# Patient Record
Sex: MALE | Race: WHITE | NOT HISPANIC OR LATINO | Employment: OTHER | URBAN - METROPOLITAN AREA
[De-identification: names, ages, dates, MRNs, and addresses within clinical notes are randomized per-mention and may not be internally consistent; named-entity substitution may affect disease eponyms.]

---

## 2023-03-07 ENCOUNTER — TELEPHONE (OUTPATIENT)
Dept: PRIMARY CARE | Facility: CLINIC | Age: 88
End: 2023-03-07
Payer: MEDICARE

## 2023-03-07 NOTE — TELEPHONE ENCOUNTER
Flavia called and stated that Victorino was taken by squad to the hospital and is Covid positive.  A team of Drs will see him today. She will up date us today

## 2023-03-10 ENCOUNTER — NURSING HOME VISIT (OUTPATIENT)
Dept: POST ACUTE CARE | Facility: EXTERNAL LOCATION | Age: 88
End: 2023-03-10
Payer: MEDICARE

## 2023-03-10 ENCOUNTER — TELEPHONE (OUTPATIENT)
Dept: PRIMARY CARE | Facility: CLINIC | Age: 88
End: 2023-03-10

## 2023-03-10 VITALS
DIASTOLIC BLOOD PRESSURE: 70 MMHG | HEART RATE: 60 BPM | BODY MASS INDEX: 18.94 KG/M2 | WEIGHT: 132 LBS | TEMPERATURE: 98.1 F | SYSTOLIC BLOOD PRESSURE: 130 MMHG | RESPIRATION RATE: 18 BRPM | OXYGEN SATURATION: 98 %

## 2023-03-10 DIAGNOSIS — B00.9 HSV INFECTION: ICD-10-CM

## 2023-03-10 DIAGNOSIS — F02.A0 MILD LATE ONSET ALZHEIMER'S DEMENTIA WITHOUT BEHAVIORAL DISTURBANCE, PSYCHOTIC DISTURBANCE, MOOD DISTURBANCE, OR ANXIETY (MULTI): ICD-10-CM

## 2023-03-10 DIAGNOSIS — G30.1 MILD LATE ONSET ALZHEIMER'S DEMENTIA WITHOUT BEHAVIORAL DISTURBANCE, PSYCHOTIC DISTURBANCE, MOOD DISTURBANCE, OR ANXIETY (MULTI): ICD-10-CM

## 2023-03-10 DIAGNOSIS — I10 BENIGN ESSENTIAL HTN: ICD-10-CM

## 2023-03-10 DIAGNOSIS — Z95.0 CARDIAC PACEMAKER: ICD-10-CM

## 2023-03-10 DIAGNOSIS — U07.1 COVID-19: Primary | ICD-10-CM

## 2023-03-10 PROBLEM — H91.90 HEARING LOSS: Status: ACTIVE | Noted: 2023-03-10

## 2023-03-10 PROBLEM — I44.2 CHB (COMPLETE HEART BLOCK) (MULTI): Status: ACTIVE | Noted: 2023-03-10

## 2023-03-10 PROBLEM — E78.5 HLD (HYPERLIPIDEMIA): Status: ACTIVE | Noted: 2023-03-10

## 2023-03-10 PROBLEM — R00.1 BRADYCARDIA, SINUS, PERSISTENT, SEVERE: Status: ACTIVE | Noted: 2023-03-10

## 2023-03-10 PROBLEM — N28.9 RENAL INSUFFICIENCY: Status: ACTIVE | Noted: 2023-03-10

## 2023-03-10 PROBLEM — I73.9 PAD (PERIPHERAL ARTERY DISEASE) (CMS-HCC): Status: ACTIVE | Noted: 2023-03-10

## 2023-03-10 PROBLEM — I44.0 AV BLOCK, 1ST DEGREE: Status: ACTIVE | Noted: 2023-03-10

## 2023-03-10 PROBLEM — I35.0 AORTIC STENOSIS: Status: ACTIVE | Noted: 2023-03-10

## 2023-03-10 PROBLEM — I82.629 DVT OF UPPER EXTREMITY (DEEP VEIN THROMBOSIS) (MULTI): Status: ACTIVE | Noted: 2023-03-10

## 2023-03-10 PROCEDURE — 99310 SBSQ NF CARE HIGH MDM 45: CPT | Performed by: NURSE PRACTITIONER

## 2023-03-10 ASSESSMENT — ENCOUNTER SYMPTOMS
NAUSEA: 0
PALPITATIONS: 0
DIFFICULTY URINATING: 0
VOMITING: 0
ABDOMINAL PAIN: 0
SHORTNESS OF BREATH: 0
CHILLS: 0
FATIGUE: 0
CONSTIPATION: 0
DIARRHEA: 0
COUGH: 0
FEVER: 0

## 2023-03-10 NOTE — ASSESSMENT & PLAN NOTE
Presented to \[facility]\"" with weakness, known exposure and tested postive for COVID.  He received 1 dose remdesivir in ER, developed transaminitis and remedesivir was not continued.  He did not require supplemental oxygen or other covid specific therapies, mild sx.  He states he feels well this morning.   He will remain in cvid isolation per facility protocol.

## 2023-03-10 NOTE — ASSESSMENT & PLAN NOTE
Mild but per hospital notes family whom he lives with states he has had a significant decline, incontinent and may need increased level of care to meet his needs.  SS for discharge planning pending clinical course of rehab.

## 2023-03-10 NOTE — LETTER
Subjective  Victorino Bejarano is a 89 y.o. male here for skilled care following hospitalization due COVID infection, weakness.     HPI   He presented to the hospital for increased wekaness, known covid exposure and tested positive.  He received 1 dose of remdesivir and developed transaminitis  and as sx were mild  no further doses given.  He is stting up in chair, states he is feeling well.  No cough, fever, chills, sob, myalgia.  Eating and drinking well.  No concerns per staff.  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  He resides with daughter and son in law, per hospital notes he has had a significant decline in the last few months, becoming incontinent and may need to consider long term care.     Labs: 3/9  WBC:  7.0  Hgb:  11.9  Hct: 35.1  Platelet: 178    Na:  137  K: 3.8  Cl: 105  Co2: 25  BUN: 43  Creatinine: 1.71  GFR: 35  Alkaline phos 193        MEDS:  Clopidogrel  Diclofenac gel  Tamsulosin  Valcycylovir    MEDS THAT WERE STOPPED: allopurinol, hydrochlorothiazide              Review of Systems   Constitutional:  Negative for chills, fatigue and fever.   Respiratory:  Negative for cough and shortness of breath.    Cardiovascular:  Negative for chest pain and palpitations.   Gastrointestinal:  Negative for abdominal pain, constipation, diarrhea, nausea and vomiting.   Genitourinary:  Negative for difficulty urinating.       Objective  /70   Pulse 60   Temp 36.7 °C (98.1 °F)   Resp 18   Wt 59.9 kg (132 lb)   SpO2 98%   BMI 18.94 kg/m²     Physical Exam  Constitutional:       General: He is not in acute distress.     Comments: Thin, frail appearing elderly male.   Cachectic.   HENT:      Head: Normocephalic and atraumatic.   Eyes:      Conjunctiva/sclera: Conjunctivae normal.   Cardiovascular:      Rate and Rhythm: Normal rate and regular rhythm.   Pulmonary:      Effort: Pulmonary effort is normal.  No respiratory distress.      Breath sounds: Normal breath sounds.   Abdominal:      General: Bowel sounds are normal. There is no distension.      Palpations: Abdomen is soft.      Tenderness: There is no abdominal tenderness.   Musculoskeletal:         General: Normal range of motion.      Right lower leg: No edema.      Left lower leg: No edema.   Skin:     General: Skin is warm and dry.   Neurological:      General: No focal deficit present.      Mental Status: He is alert.      Comments: Oriented to person and place.     Psychiatric:         Mood and Affect: Mood normal.         Behavior: Behavior normal.         Assessment/Plan  Problem List Items Addressed This Visit          Nervous    Mild late onset Alzheimer's dementia without behavioral disturbance, psychotic disturbance, mood disturbance, or anxiety (CMS/HCC)     Mild but per hospital notes family whom he lives with states he has had a significant decline, incontinent and may need increased level of care to meet his needs.  SS for discharge planning pending clinical course of rehab.             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         COVID-19 - Primary     Presented to [facility] with weakness, known exposure and tested postive for COVID.  He received 1 dose remdesivir in ER, developed transaminitis and remedesivir was not continued.  He did not require supplemental oxygen or other covid specific therapies, mild sx.  He states he feels well this morning.   He will remain in cvid isolation per facility protocol.             labs/meds/orders reviewed  staff to monitor and notify for any changes.  continue with therapy as able.  Ss for discharge planning, resides with family but has increased care needs.   Time for coordination of care was greater than  35 minutes with hospital chart review, visit and exam, discussion of treatment plan with patient and also discussion of case with staff.

## 2023-03-10 NOTE — PROGRESS NOTES
Subjective   Victorino Bejarano is a 89 y.o. male here for skilled care following hospitalization due COVID infection, weakness.     HPI   He presented to the hospital for increased wekaness, known covid exposure and tested positive.  He received 1 dose of remdesivir and developed transaminitis  and as sx were mild  no further doses given.  He is stting up in chair, states he is feeling well.  No cough, fever, chills, sob, myalgia.  Eating and drinking well.  No concerns per staff.  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  He resides with daughter and son in law, per hospital notes he has had a significant decline in the last few months, becoming incontinent and may need to consider long term care.     Labs: 3/9  WBC:  7.0  Hgb:  11.9  Hct: 35.1  Platelet: 178    Na:  137  K: 3.8  Cl: 105  Co2: 25  BUN: 43  Creatinine: 1.71  GFR: 35  Alkaline phos 193        MEDS:  Clopidogrel  Diclofenac gel  Tamsulosin  Valcycylovir    MEDS THAT WERE STOPPED: allopurinol, hydrochlorothiazide              Review of Systems   Constitutional:  Negative for chills, fatigue and fever.   Respiratory:  Negative for cough and shortness of breath.    Cardiovascular:  Negative for chest pain and palpitations.   Gastrointestinal:  Negative for abdominal pain, constipation, diarrhea, nausea and vomiting.   Genitourinary:  Negative for difficulty urinating.       Objective   /70   Pulse 60   Temp 36.7 °C (98.1 °F)   Resp 18   Wt 59.9 kg (132 lb)   SpO2 98%   BMI 18.94 kg/m²     Physical Exam  Constitutional:       General: He is not in acute distress.     Comments: Thin, frail appearing elderly male.   Cachectic.   HENT:      Head: Normocephalic and atraumatic.   Eyes:      Conjunctiva/sclera: Conjunctivae normal.   Cardiovascular:      Rate and Rhythm: Normal rate and regular rhythm.   Pulmonary:      Effort: Pulmonary effort is normal.  No respiratory distress.      Breath sounds: Normal breath sounds.   Abdominal:      General: Bowel sounds are normal. There is no distension.      Palpations: Abdomen is soft.      Tenderness: There is no abdominal tenderness.   Musculoskeletal:         General: Normal range of motion.      Right lower leg: No edema.      Left lower leg: No edema.   Skin:     General: Skin is warm and dry.   Neurological:      General: No focal deficit present.      Mental Status: He is alert.      Comments: Oriented to person and place.     Psychiatric:         Mood and Affect: Mood normal.         Behavior: Behavior normal.         Assessment/Plan   Problem List Items Addressed This Visit          Nervous    Mild late onset Alzheimer's dementia without behavioral disturbance, psychotic disturbance, mood disturbance, or anxiety (CMS/HCC)     Mild but per hospital notes family whom he lives with states he has had a significant decline, incontinent and may need increased level of care to meet his needs.  SS for discharge planning pending clinical course of rehab.             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         COVID-19 - Primary     Presented to [facility] with weakness, known exposure and tested postive for COVID.  He received 1 dose remdesivir in ER, developed transaminitis and remedesivir was not continued.  He did not require supplemental oxygen or other covid specific therapies, mild sx.  He states he feels well this morning.   He will remain in cvid isolation per facility protocol.             labs/meds/orders reviewed  staff to monitor and notify for any changes.  continue with therapy as able.  Ss for discharge planning, resides with family but has increased care needs.   Time for coordination of care was greater  than 35 minutes with hospital chart review, visit and exam, discussion of treatment plan with patient and also discussion of case with staff.

## 2023-03-10 NOTE — TELEPHONE ENCOUNTER
Sydney called and stated Victorino is in [facility] for Rehab but will most likely not come home.  Their Drs will take over his care.

## 2023-03-16 ENCOUNTER — NURSING HOME VISIT (OUTPATIENT)
Dept: POST ACUTE CARE | Facility: EXTERNAL LOCATION | Age: 88
End: 2023-03-16
Payer: MEDICARE

## 2023-03-16 VITALS
BODY MASS INDEX: 18.94 KG/M2 | SYSTOLIC BLOOD PRESSURE: 122 MMHG | OXYGEN SATURATION: 97 % | WEIGHT: 132 LBS | TEMPERATURE: 97.7 F | RESPIRATION RATE: 18 BRPM | DIASTOLIC BLOOD PRESSURE: 61 MMHG | HEART RATE: 76 BPM

## 2023-03-16 DIAGNOSIS — I35.0 NONRHEUMATIC AORTIC VALVE STENOSIS: ICD-10-CM

## 2023-03-16 DIAGNOSIS — Z95.0 CARDIAC PACEMAKER: ICD-10-CM

## 2023-03-16 DIAGNOSIS — U07.1 COVID-19: ICD-10-CM

## 2023-03-16 DIAGNOSIS — F02.A0 MILD LATE ONSET ALZHEIMER'S DEMENTIA WITHOUT BEHAVIORAL DISTURBANCE, PSYCHOTIC DISTURBANCE, MOOD DISTURBANCE, OR ANXIETY (MULTI): Primary | ICD-10-CM

## 2023-03-16 DIAGNOSIS — I10 BENIGN ESSENTIAL HTN: ICD-10-CM

## 2023-03-16 DIAGNOSIS — G30.1 MILD LATE ONSET ALZHEIMER'S DEMENTIA WITHOUT BEHAVIORAL DISTURBANCE, PSYCHOTIC DISTURBANCE, MOOD DISTURBANCE, OR ANXIETY (MULTI): Primary | ICD-10-CM

## 2023-03-16 PROCEDURE — 99309 SBSQ NF CARE MODERATE MDM 30: CPT | Performed by: NURSE PRACTITIONER

## 2023-03-16 ASSESSMENT — ENCOUNTER SYMPTOMS
CONSTIPATION: 0
COUGH: 0
VOMITING: 0
CHILLS: 0
PALPITATIONS: 0
SHORTNESS OF BREATH: 0
ABDOMINAL PAIN: 0
DIARRHEA: 0
FEVER: 0
DIFFICULTY URINATING: 0
NAUSEA: 0
FATIGUE: 0

## 2023-03-16 NOTE — LETTER
Patient: Victorino Bejarano  : 10/25/1924    Encounter Date: 2023    Subjective  Victorino Bejarano is a 89 y.o. male Here for weekly skilled visit.   HPI  Health problems reviewed and no acute concerns.  Participating in therapy and feeling stronger.  Eating and drinking well.  he denies any complaints of pain.  No concerns per staff.   He has completed covid quarantine, no sx at present.   Riding a bike in therapy, no cough or sob.  [place].       Review of Systems   Constitutional:  Negative for chills, fatigue and fever.   Respiratory:  Negative for cough and shortness of breath.    Cardiovascular:  Negative for chest pain and palpitations.   Gastrointestinal:  Negative for abdominal pain, constipation, diarrhea, nausea and vomiting.   Genitourinary:  Negative for difficulty urinating.       Objective  /61   Pulse 76   Temp 36.5 °C (97.7 °F)   Resp 18   Wt 59.9 kg (132 lb)   SpO2 97%   BMI 18.94 kg/m²     Physical Exam  Constitutional:       General: He is not in acute distress.  HENT:      Head: Normocephalic and atraumatic.   Eyes:      Conjunctiva/sclera: Conjunctivae normal.   Cardiovascular:      Rate and Rhythm: Normal rate. Rhythm irregular.   Pulmonary:      Effort: Pulmonary effort is normal. No respiratory distress.      Breath sounds: Normal breath sounds.   Abdominal:      General: Bowel sounds are normal. There is no distension.      Palpations: Abdomen is soft.      Tenderness: There is no abdominal tenderness.   Musculoskeletal:         General: Normal range of motion.      Right lower leg: No edema.      Left lower leg: No edema.   Skin:     General: Skin is warm and dry.   Neurological:      General: No focal deficit present.      Mental Status: He is alert.      Comments: Slightly forgetful, poor historian.     Psychiatric:         Mood and Affect: Mood normal.         Behavior: Behavior normal.         Assessment/Plan  Problem List Items Addressed This Visit          Nervous     Mild late onset Alzheimer's dementia without behavioral disturbance, psychotic disturbance, mood disturbance, or anxiety (CMS/[facility]) - Primary     Mild but per hospital notes family whom he lives with states he has had a significant decline, incontinent and may need increased level of care to meet his needs.  SS for discharge planning pending clinical course of rehab.              Circulatory    Nonrheumatic aortic valve stenosis     Denies any sob, lightheadedness.           Cardiac pacemaker     Hx complete heart block          Benign essential HTN     Not on meds,  continue to monitor and initate meds based on clinical course             Infectious/Inflammatory    COVID-19     He has completed COVID isolation and denies any cough, fever, chills .          labs/meds/orders reviewed  staff to monitor and notify for any changes.  continue with therapy as able.  Ss for discharge planning        Electronically Signed By: NIELS Camarillo   3/16/23  1:04 PM

## 2023-03-16 NOTE — PROGRESS NOTES
Subjective   Victorino Bejarano is a 89 y.o. male Here for weekly skilled visit.   HPI  Health problems reviewed and no acute concerns.  Participating in therapy and feeling stronger.  Eating and drinking well.  he denies any complaints of pain.  No concerns per staff.   He has completed covid quarantine, no sx at present.   Riding a bike in therapy, no cough or sob.  [place].       Review of Systems   Constitutional:  Negative for chills, fatigue and fever.   Respiratory:  Negative for cough and shortness of breath.    Cardiovascular:  Negative for chest pain and palpitations.   Gastrointestinal:  Negative for abdominal pain, constipation, diarrhea, nausea and vomiting.   Genitourinary:  Negative for difficulty urinating.       Objective   /61   Pulse 76   Temp 36.5 °C (97.7 °F)   Resp 18   Wt 59.9 kg (132 lb)   SpO2 97%   BMI 18.94 kg/m²     Physical Exam  Constitutional:       General: He is not in acute distress.  HENT:      Head: Normocephalic and atraumatic.   Eyes:      Conjunctiva/sclera: Conjunctivae normal.   Cardiovascular:      Rate and Rhythm: Normal rate. Rhythm irregular.   Pulmonary:      Effort: Pulmonary effort is normal. No respiratory distress.      Breath sounds: Normal breath sounds.   Abdominal:      General: Bowel sounds are normal. There is no distension.      Palpations: Abdomen is soft.      Tenderness: There is no abdominal tenderness.   Musculoskeletal:         General: Normal range of motion.      Right lower leg: No edema.      Left lower leg: No edema.   Skin:     General: Skin is warm and dry.   Neurological:      General: No focal deficit present.      Mental Status: He is alert.      Comments: Slightly forgetful, poor historian.     Psychiatric:         Mood and Affect: Mood normal.         Behavior: Behavior normal.         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  SS for discharge planning pending clinical course of rehab.              Circulatory    Nonrheumatic aortic valve stenosis     Denies any sob, lightheadedness.           Cardiac pacemaker     Hx complete heart block          Benign essential HTN     Not on meds,  continue to monitor and initate meds based on clinical course             Infectious/Inflammatory    COVID-19     He has completed COVID isolation and denies any cough, fever, chills .          labs/meds/orders reviewed  staff to monitor and notify for any changes.  continue with therapy as able.  Ss for discharge planning

## 2023-03-17 ENCOUNTER — NURSING HOME VISIT (OUTPATIENT)
Dept: POST ACUTE CARE | Facility: EXTERNAL LOCATION | Age: 88
End: 2023-03-17
Payer: MEDICARE

## 2023-03-17 VITALS
RESPIRATION RATE: 18 BRPM | SYSTOLIC BLOOD PRESSURE: 125 MMHG | OXYGEN SATURATION: 97 % | WEIGHT: 133 LBS | DIASTOLIC BLOOD PRESSURE: 68 MMHG | TEMPERATURE: 97.8 F | BODY MASS INDEX: 19.08 KG/M2 | HEART RATE: 77 BPM

## 2023-03-17 DIAGNOSIS — U07.1 COVID-19: Primary | ICD-10-CM

## 2023-03-17 DIAGNOSIS — Z95.0 CARDIAC PACEMAKER: ICD-10-CM

## 2023-03-17 DIAGNOSIS — E44.1 MILD PROTEIN-CALORIE MALNUTRITION (MULTI): ICD-10-CM

## 2023-03-17 DIAGNOSIS — I10 BENIGN ESSENTIAL HTN: ICD-10-CM

## 2023-03-17 PROCEDURE — 99306 1ST NF CARE HIGH MDM 50: CPT | Performed by: INTERNAL MEDICINE

## 2023-03-17 NOTE — LETTER
Patient: Victorino Bejarano  : 10/25/1924    Encounter Date: 2023    Subjective  Patient ID: Victorino Bejarano is a 89 y.o. male who is acute skilled care being seen and evaluated for multiple medical problems.    HPI   Victorino Bejarano is a 89 y.o. male here for skilled care following hospitalization due COVID infection, weakness.      HPI   He presented to the hospital for increased wekaness, known covid exposure and tested positive.  He received 1 dose of remdesivir and developed transaminitis  and as sx were mild  no further doses given.  He is stting up in chair, states he is feeling well.  No cough, fever, chills, sob, myalgia.  Eating and drinking well.  No concerns per staff.  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  He resides with daughter and son in law, per hospital notes he has had a significant decline in the last few months, becoming incontinent and may need to consider long term care.    Subtle the patient is resting comfortably in bed in no distress.  He has no complaints for me today.  Review of the hospital stay indicates that the patient's hydrochlorothiazide and allopurinol were discontinued due to following completion and liver injury.  Nursing has no adverse events reported to me at this time.     Labs: 3/9  WBC:  7.0  Hgb:  11.9  Hct: 35.1  Platelet: 178     Na:  137  K: 3.8  Cl: 105  Co2: 25  BUN: 43  Creatinine: 1.71  GFR: 35  Alkaline phos 193        Albumin 3.0  Phosphorus 2.6  Chest x-ray negative  CT scan of the brain negative     MEDS:  Clopidogrel  Diclofenac gel  Tamsulosin  Valcycylovir     MEDS THAT WERE STOPPED: allopurinol, hydrochlorothiazide     Review of Systems   Constitutional: Negative.  Negative for chills, diaphoresis and fever.   Respiratory: Negative.  Negative for cough and shortness of breath.    Cardiovascular: Negative.  Negative for chest pain and palpitations.    Gastrointestinal: Negative.  Negative for abdominal pain, nausea and vomiting.   Musculoskeletal: Negative.  Negative for joint swelling and myalgias.       Objective  /68   Pulse 77   Temp 36.6 °C (97.8 °F)   Resp 18   Wt 60.3 kg (133 lb)   SpO2 97%   BMI 19.08 kg/m²     Physical Exam  Vitals reviewed.   Constitutional:       General: He is not in acute distress.     Appearance: He is normal weight. He is not ill-appearing.      Comments: This is a frail elderly male with decreased muscle mass in no distress   Cardiovascular:      Rate and Rhythm: Normal rate and regular rhythm.      Pulses: Normal pulses.      Heart sounds: Normal heart sounds.      No gallop.   Pulmonary:      Breath sounds: Normal breath sounds. No wheezing, rhonchi or rales.   Abdominal:      General: Abdomen is flat. Bowel sounds are normal.      Palpations: Abdomen is soft.      Tenderness: There is no guarding or rebound.   Musculoskeletal:      Right lower leg: No edema.      Left lower leg: No edema.      Comments: Diffusely decreased muscle mass   Skin:     General: Skin is warm and dry.   Neurological:      General: No focal deficit present.         Assessment/Plan  Problem List Items Addressed This Visit          Circulatory    Cardiac pacemaker    Benign essential HTN       Endocrine/Metabolic    Protein calorie malnutrition (CMS/HCC)       Infectious/Inflammatory    COVID-19 - Primary        Goals    None     A.  We will continue with rehabilitative restorative and supportive care as the patient tolerates    B.  Laboratory examinations will continue to be drawn on an ongoing as-needed basis.  We will recheck liver function panel in 2 weeks    C.  If liver functions stabilize and patient develops any gouty symptoms then we will have to explore the possibility of reinstitution of allopurinol therapy.    D.  Disposition will be pending response to rehabilitation and overall assessment of patient safety awareness however it  is possible the patient may require long-term care    E.  The patient's prognosis is guarded.      Electronically Signed By: Saurabh Luna MD   3/20/23  5:57 PM

## 2023-03-17 NOTE — PROGRESS NOTES
Subjective   Patient ID: Victorino Bejarano is a 89 y.o. male who is acute skilled care being seen and evaluated for multiple medical problems.    HPI   Victorino Bejarano is a 89 y.o. male here for skilled care following hospitalization due COVID infection, weakness.      HPI   He presented to the hospital for increased wekaness, known covid exposure and tested positive.  He received 1 dose of remdesivir and developed transaminitis  and as sx were mild  no further doses given.  He is stting up in chair, states he is feeling well.  No cough, fever, chills, sob, myalgia.  Eating and drinking well.  No concerns per staff.  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  He resides with daughter and son in law, per hospital notes he has had a significant decline in the last few months, becoming incontinent and may need to consider long term care.    Subtle the patient is resting comfortably in bed in no distress.  He has no complaints for me today.  Review of the hospital stay indicates that the patient's hydrochlorothiazide and allopurinol were discontinued due to following completion and liver injury.  Nursing has no adverse events reported to me at this time.     Labs: 3/9  WBC:  7.0  Hgb:  11.9  Hct: 35.1  Platelet: 178     Na:  137  K: 3.8  Cl: 105  Co2: 25  BUN: 43  Creatinine: 1.71  GFR: 35  Alkaline phos 193        Albumin 3.0  Phosphorus 2.6  Chest x-ray negative  CT scan of the brain negative     MEDS:  Clopidogrel  Diclofenac gel  Tamsulosin  Valcycylovir     MEDS THAT WERE STOPPED: allopurinol, hydrochlorothiazide     Review of Systems   Constitutional: Negative.  Negative for chills, diaphoresis and fever.   Respiratory: Negative.  Negative for cough and shortness of breath.    Cardiovascular: Negative.  Negative for chest pain and palpitations.   Gastrointestinal: Negative.  Negative for abdominal pain, nausea and  vomiting.   Musculoskeletal: Negative.  Negative for joint swelling and myalgias.       Objective   /68   Pulse 77   Temp 36.6 °C (97.8 °F)   Resp 18   Wt 60.3 kg (133 lb)   SpO2 97%   BMI 19.08 kg/m²     Physical Exam  Vitals reviewed.   Constitutional:       General: He is not in acute distress.     Appearance: He is normal weight. He is not ill-appearing.      Comments: This is a frail elderly male with decreased muscle mass in no distress   Cardiovascular:      Rate and Rhythm: Normal rate and regular rhythm.      Pulses: Normal pulses.      Heart sounds: Normal heart sounds.      No gallop.   Pulmonary:      Breath sounds: Normal breath sounds. No wheezing, rhonchi or rales.   Abdominal:      General: Abdomen is flat. Bowel sounds are normal.      Palpations: Abdomen is soft.      Tenderness: There is no guarding or rebound.   Musculoskeletal:      Right lower leg: No edema.      Left lower leg: No edema.      Comments: Diffusely decreased muscle mass   Skin:     General: Skin is warm and dry.   Neurological:      General: No focal deficit present.         Assessment/Plan   Problem List Items Addressed This Visit          Circulatory    Cardiac pacemaker    Benign essential HTN       Endocrine/Metabolic    Protein calorie malnutrition (CMS/HCC)       Infectious/Inflammatory    COVID-19 - Primary        Goals    None     A.  We will continue with rehabilitative restorative and supportive care as the patient tolerates    B.  Laboratory examinations will continue to be drawn on an ongoing as-needed basis.  We will recheck liver function panel in 2 weeks    C.  If liver functions stabilize and patient develops any gouty symptoms then we will have to explore the possibility of reinstitution of allopurinol therapy.    D.  Disposition will be pending response to rehabilitation and overall assessment of patient safety awareness however it is possible the patient may require long-term care    E.  The  patient's prognosis is guarded.

## 2023-03-20 PROBLEM — E46 PROTEIN CALORIE MALNUTRITION (MULTI): Status: ACTIVE | Noted: 2023-03-20

## 2023-03-20 ASSESSMENT — ENCOUNTER SYMPTOMS
CARDIOVASCULAR NEGATIVE: 1
ABDOMINAL PAIN: 0
VOMITING: 0
PALPITATIONS: 0
CHILLS: 0
DIAPHORESIS: 0
SHORTNESS OF BREATH: 0
FEVER: 0
COUGH: 0
MYALGIAS: 0
GASTROINTESTINAL NEGATIVE: 1
NAUSEA: 0
MUSCULOSKELETAL NEGATIVE: 1
JOINT SWELLING: 0
CONSTITUTIONAL NEGATIVE: 1
RESPIRATORY NEGATIVE: 1

## 2023-03-24 ENCOUNTER — NURSING HOME VISIT (OUTPATIENT)
Dept: POST ACUTE CARE | Facility: EXTERNAL LOCATION | Age: 88
End: 2023-03-24
Payer: MEDICARE

## 2023-03-24 VITALS
HEART RATE: 74 BPM | TEMPERATURE: 97.6 F | RESPIRATION RATE: 18 BRPM | SYSTOLIC BLOOD PRESSURE: 123 MMHG | WEIGHT: 141 LBS | BODY MASS INDEX: 20.23 KG/M2 | OXYGEN SATURATION: 98 % | DIASTOLIC BLOOD PRESSURE: 60 MMHG

## 2023-03-24 DIAGNOSIS — F02.A0 MILD LATE ONSET ALZHEIMER'S DEMENTIA WITHOUT BEHAVIORAL DISTURBANCE, PSYCHOTIC DISTURBANCE, MOOD DISTURBANCE, OR ANXIETY (MULTI): ICD-10-CM

## 2023-03-24 DIAGNOSIS — I10 BENIGN ESSENTIAL HTN: ICD-10-CM

## 2023-03-24 DIAGNOSIS — R60.0 LEG EDEMA: Primary | ICD-10-CM

## 2023-03-24 DIAGNOSIS — G30.1 MILD LATE ONSET ALZHEIMER'S DEMENTIA WITHOUT BEHAVIORAL DISTURBANCE, PSYCHOTIC DISTURBANCE, MOOD DISTURBANCE, OR ANXIETY (MULTI): ICD-10-CM

## 2023-03-24 DIAGNOSIS — N28.9 RENAL INSUFFICIENCY: ICD-10-CM

## 2023-03-24 PROCEDURE — 99309 SBSQ NF CARE MODERATE MDM 30: CPT | Performed by: INTERNAL MEDICINE

## 2023-03-24 NOTE — LETTER
Patient: Victorino Bejarano  : 10/25/1924    Encounter Date: 2023    Subjective  Patient ID: Victorino Bejarano is a 89 y.o. male who is acute skilled care being seen and evaluated for multiple medical problems.    HPI   This patient is resting comfortably in bed in no distress.  He has absolutely no complaints of pain shortness of breath fevers chills nausea or vomiting to me at this time.  Nursing reports he has been doing quite well.  He has developed edema of bilateral lower extremities above the ankle.  His weight has been noted to increase over the past 3 weeks.  Of note the patient's allopurinol and hydrochlorothiazide were held while inpatient due to a mild liver injury.    Current high risk medication:  Tamsulosin  Plavix  Valacyclovir    There are no new laboratory examinations for review of the chart at this time.    Review of Systems   Constitutional: Negative.  Negative for chills, diaphoresis and fever.   Respiratory: Negative.  Negative for cough and shortness of breath.    Cardiovascular: Negative.  Negative for chest pain and palpitations.   Gastrointestinal: Negative.  Negative for abdominal pain, nausea and vomiting.   Musculoskeletal: Negative.  Negative for joint swelling and myalgias.       Objective  /60   Pulse 74   Temp 36.4 °C (97.6 °F)   Resp 18   Wt 64 kg (141 lb)   SpO2 98%   BMI 20.23 kg/m²     Physical Exam  Vitals reviewed.   Constitutional:       General: He is not in acute distress.     Appearance: He is not ill-appearing.   Cardiovascular:      Rate and Rhythm: Normal rate and regular rhythm.      Pulses: Normal pulses.      Heart sounds: Normal heart sounds.      No gallop.   Pulmonary:      Breath sounds: Normal breath sounds. No wheezing, rhonchi or rales.   Abdominal:      General: Abdomen is flat. Bowel sounds are normal.      Palpations: Abdomen is soft.      Tenderness: There is no guarding or rebound.   Musculoskeletal:      Right lower leg: Edema (1+ edema  to ankle) present.      Left lower leg: Edema (1+ edema to ankle) present.         Assessment/Plan  Problem List Items Addressed This Visit          Nervous    Mild late onset Alzheimer's dementia without behavioral disturbance, psychotic disturbance, mood disturbance, or anxiety (CMS/HCC)       Circulatory    Benign essential HTN       Genitourinary    Renal insufficiency       Musculoskeletal    Leg edema - Primary     A.  We will continue with rehabilitative restorative and supportive care as the patient tolerates    B.  Triamterene/HCTZ 37.5/25 1/2 tablet daily has been started as a low-dose diuretic in place of plain hydrochlorothiazide in hopes of avoiding hypokalemia which the patient may have had a moderate amount of when he presented to the hospital.    C.  Laboratory examination for renal function magnesium will be repeated 1 week after starting the diuretic therapy    D.  Disposition will be determined pending response to rehabilitation overall assessment of patient safety awareness    E.  The patient's prognosis is guarded.        Electronically Signed By: Saurabh Luna MD   3/27/23  1:36 PM

## 2023-03-24 NOTE — PROGRESS NOTES
Subjective   Patient ID: Victorino Bejarano is a 89 y.o. male who is acute skilled care being seen and evaluated for multiple medical problems.    HPI   This patient is resting comfortably in bed in no distress.  He has absolutely no complaints of pain shortness of breath fevers chills nausea or vomiting to me at this time.  Nursing reports he has been doing quite well.  He has developed edema of bilateral lower extremities above the ankle.  His weight has been noted to increase over the past 3 weeks.  Of note the patient's allopurinol and hydrochlorothiazide were held while inpatient due to a mild liver injury.    Current high risk medication:  Tamsulosin  Plavix  Valacyclovir    There are no new laboratory examinations for review of the chart at this time.    Review of Systems   Constitutional: Negative.  Negative for chills, diaphoresis and fever.   Respiratory: Negative.  Negative for cough and shortness of breath.    Cardiovascular: Negative.  Negative for chest pain and palpitations.   Gastrointestinal: Negative.  Negative for abdominal pain, nausea and vomiting.   Musculoskeletal: Negative.  Negative for joint swelling and myalgias.       Objective   /60   Pulse 74   Temp 36.4 °C (97.6 °F)   Resp 18   Wt 64 kg (141 lb)   SpO2 98%   BMI 20.23 kg/m²     Physical Exam  Vitals reviewed.   Constitutional:       General: He is not in acute distress.     Appearance: He is not ill-appearing.   Cardiovascular:      Rate and Rhythm: Normal rate and regular rhythm.      Pulses: Normal pulses.      Heart sounds: Normal heart sounds.      No gallop.   Pulmonary:      Breath sounds: Normal breath sounds. No wheezing, rhonchi or rales.   Abdominal:      General: Abdomen is flat. Bowel sounds are normal.      Palpations: Abdomen is soft.      Tenderness: There is no guarding or rebound.   Musculoskeletal:      Right lower leg: Edema (1+ edema to ankle) present.      Left lower leg: Edema (1+ edema to ankle)  present.         Assessment/Plan   Problem List Items Addressed This Visit          Nervous    Mild late onset Alzheimer's dementia without behavioral disturbance, psychotic disturbance, mood disturbance, or anxiety (CMS/HCC)       Circulatory    Benign essential HTN       Genitourinary    Renal insufficiency       Musculoskeletal    Leg edema - Primary     A.  We will continue with rehabilitative restorative and supportive care as the patient tolerates    B.  Triamterene/HCTZ 37.5/25 1/2 tablet daily has been started as a low-dose diuretic in place of plain hydrochlorothiazide in hopes of avoiding hypokalemia which the patient may have had a moderate amount of when he presented to the hospital.    C.  Laboratory examination for renal function magnesium will be repeated 1 week after starting the diuretic therapy    D.  Disposition will be determined pending response to rehabilitation overall assessment of patient safety awareness    E.  The patient's prognosis is guarded.

## 2023-03-27 PROBLEM — R60.0 LEG EDEMA: Status: ACTIVE | Noted: 2023-03-27

## 2023-03-27 ASSESSMENT — ENCOUNTER SYMPTOMS
DIAPHORESIS: 0
CONSTITUTIONAL NEGATIVE: 1
MYALGIAS: 0
VOMITING: 0
ABDOMINAL PAIN: 0
GASTROINTESTINAL NEGATIVE: 1
CHILLS: 0
FEVER: 0
SHORTNESS OF BREATH: 0
JOINT SWELLING: 0
MUSCULOSKELETAL NEGATIVE: 1
CARDIOVASCULAR NEGATIVE: 1
COUGH: 0
NAUSEA: 0
RESPIRATORY NEGATIVE: 1
PALPITATIONS: 0

## 2023-03-30 ENCOUNTER — NURSING HOME VISIT (OUTPATIENT)
Dept: POST ACUTE CARE | Facility: EXTERNAL LOCATION | Age: 88
End: 2023-03-30
Payer: MEDICARE

## 2023-03-30 VITALS
RESPIRATION RATE: 18 BRPM | OXYGEN SATURATION: 96 % | BODY MASS INDEX: 20.23 KG/M2 | DIASTOLIC BLOOD PRESSURE: 74 MMHG | WEIGHT: 141 LBS | SYSTOLIC BLOOD PRESSURE: 124 MMHG | HEART RATE: 76 BPM | TEMPERATURE: 97.8 F

## 2023-03-30 DIAGNOSIS — U07.1 COVID-19: ICD-10-CM

## 2023-03-30 DIAGNOSIS — N28.9 RENAL INSUFFICIENCY: ICD-10-CM

## 2023-03-30 DIAGNOSIS — I10 BENIGN ESSENTIAL HTN: ICD-10-CM

## 2023-03-30 DIAGNOSIS — Z95.0 CARDIAC PACEMAKER: ICD-10-CM

## 2023-03-30 DIAGNOSIS — F02.A0 MILD LATE ONSET ALZHEIMER'S DEMENTIA WITHOUT BEHAVIORAL DISTURBANCE, PSYCHOTIC DISTURBANCE, MOOD DISTURBANCE, OR ANXIETY (MULTI): Primary | ICD-10-CM

## 2023-03-30 DIAGNOSIS — G30.1 MILD LATE ONSET ALZHEIMER'S DEMENTIA WITHOUT BEHAVIORAL DISTURBANCE, PSYCHOTIC DISTURBANCE, MOOD DISTURBANCE, OR ANXIETY (MULTI): Primary | ICD-10-CM

## 2023-03-30 PROCEDURE — 99309 SBSQ NF CARE MODERATE MDM 30: CPT | Performed by: NURSE PRACTITIONER

## 2023-03-30 ASSESSMENT — ENCOUNTER SYMPTOMS
SHORTNESS OF BREATH: 0
CONSTIPATION: 0
PALPITATIONS: 0
COUGH: 0
FATIGUE: 0
ABDOMINAL PAIN: 0
DIFFICULTY URINATING: 0
CHILLS: 0
VOMITING: 0
FEVER: 0
DIARRHEA: 0
NAUSEA: 0

## 2023-03-30 NOTE — PROGRESS NOTES
Subjective   Victorino Bejarano is a 89 y.o. male Here for weekly skilled visit.   HPI  Health problems reviewed and no acute concerns.  Participating in therapy and feeling stronger.  Eating and drinking well.  he denies any complaints of pain.  No concerns per staff.  [place].       Review of Systems   Constitutional:  Negative for chills, fatigue and fever.   Respiratory:  Negative for cough and shortness of breath.    Cardiovascular:  Negative for chest pain and palpitations.   Gastrointestinal:  Negative for abdominal pain, constipation, diarrhea, nausea and vomiting.   Genitourinary:  Negative for difficulty urinating.       Objective   /74   Pulse 76   Temp 36.6 °C (97.8 °F)   Resp 18   Wt 64 kg (141 lb)   SpO2 96%   BMI 20.23 kg/m²     Physical Exam  Constitutional:       General: He is not in acute distress.  HENT:      Head: Normocephalic and atraumatic.   Eyes:      Conjunctiva/sclera: Conjunctivae normal.   Cardiovascular:      Rate and Rhythm: Normal rate. Rhythm irregular.   Pulmonary:      Effort: Pulmonary effort is normal. No respiratory distress.      Breath sounds: Normal breath sounds.   Abdominal:      General: Bowel sounds are normal. There is no distension.      Palpations: Abdomen is soft.      Tenderness: There is no abdominal tenderness.   Musculoskeletal:         General: Normal range of motion.      Right lower leg: No edema.      Left lower leg: No edema.   Skin:     General: Skin is warm and dry.   Neurological:      General: No focal deficit present.      Mental Status: He is alert.      Comments: Slightly forgetful, poor historian.     Psychiatric:         Mood and Affect: Mood normal.         Behavior: Behavior normal.         Assessment/Plan   Problem List Items Addressed This Visit    None  labs/meds/orders reviewed  staff to monitor and notify for any changes.  continue with therapy as able.  Ss for discharge planning

## 2023-03-30 NOTE — LETTER
Patient: Victorino Bejarano  : 10/25/1924    Encounter Date: 2023    Subjective  Victorino Bejarano is a 89 y.o. male Here for weekly skilled visit.   HPI  Health problems reviewed and no acute concerns.  Participating in therapy and feeling stronger.  Eating and drinking well.  he denies any complaints of pain.  No concerns per staff.  [place].       Review of Systems   Constitutional:  Negative for chills, fatigue and fever.   Respiratory:  Negative for cough and shortness of breath.    Cardiovascular:  Negative for chest pain and palpitations.   Gastrointestinal:  Negative for abdominal pain, constipation, diarrhea, nausea and vomiting.   Genitourinary:  Negative for difficulty urinating.       Objective  /74   Pulse 76   Temp 36.6 °C (97.8 °F)   Resp 18   Wt 64 kg (141 lb)   SpO2 96%   BMI 20.23 kg/m²     Physical Exam  Constitutional:       General: He is not in acute distress.  HENT:      Head: Normocephalic and atraumatic.   Eyes:      Conjunctiva/sclera: Conjunctivae normal.   Cardiovascular:      Rate and Rhythm: Normal rate. Rhythm irregular.   Pulmonary:      Effort: Pulmonary effort is normal. No respiratory distress.      Breath sounds: Normal breath sounds.   Abdominal:      General: Bowel sounds are normal. There is no distension.      Palpations: Abdomen is soft.      Tenderness: There is no abdominal tenderness.   Musculoskeletal:         General: Normal range of motion.      Right lower leg: No edema.      Left lower leg: No edema.   Skin:     General: Skin is warm and dry.   Neurological:      General: No focal deficit present.      Mental Status: He is alert.      Comments: Slightly forgetful, poor historian.     Psychiatric:         Mood and Affect: Mood normal.         Behavior: Behavior normal.         Assessment/Plan  Problem List Items Addressed This Visit    None  labs/meds/orders reviewed  staff to monitor and notify for any changes.  continue with therapy as able.  Ss for  discharge planning        Electronically Signed By: NIELS Camarillo   3/30/23 12:01 PM

## 2023-03-30 NOTE — ASSESSMENT & PLAN NOTE
Mild but per hospital notes family whom he lives with states he has had a significant decline, incontinent and may need increased level of care to meet his needs.  SS for discharge planning pending clinical course of rehab.   Per staff family is pursuing long term care.

## 2023-04-06 ENCOUNTER — NURSING HOME VISIT (OUTPATIENT)
Dept: POST ACUTE CARE | Facility: EXTERNAL LOCATION | Age: 88
End: 2023-04-06
Payer: MEDICARE

## 2023-04-06 VITALS
TEMPERATURE: 97.7 F | DIASTOLIC BLOOD PRESSURE: 68 MMHG | OXYGEN SATURATION: 98 % | BODY MASS INDEX: 20.37 KG/M2 | SYSTOLIC BLOOD PRESSURE: 121 MMHG | RESPIRATION RATE: 18 BRPM | HEART RATE: 74 BPM | WEIGHT: 142 LBS

## 2023-04-06 DIAGNOSIS — Z95.0 CARDIAC PACEMAKER: ICD-10-CM

## 2023-04-06 DIAGNOSIS — I10 BENIGN ESSENTIAL HTN: ICD-10-CM

## 2023-04-06 DIAGNOSIS — F02.A0 MILD LATE ONSET ALZHEIMER'S DEMENTIA WITHOUT BEHAVIORAL DISTURBANCE, PSYCHOTIC DISTURBANCE, MOOD DISTURBANCE, OR ANXIETY (MULTI): Primary | ICD-10-CM

## 2023-04-06 DIAGNOSIS — G30.1 MILD LATE ONSET ALZHEIMER'S DEMENTIA WITHOUT BEHAVIORAL DISTURBANCE, PSYCHOTIC DISTURBANCE, MOOD DISTURBANCE, OR ANXIETY (MULTI): Primary | ICD-10-CM

## 2023-04-06 DIAGNOSIS — U07.1 COVID-19: ICD-10-CM

## 2023-04-06 DIAGNOSIS — N28.9 RENAL INSUFFICIENCY: ICD-10-CM

## 2023-04-06 PROCEDURE — 99309 SBSQ NF CARE MODERATE MDM 30: CPT | Performed by: NURSE PRACTITIONER

## 2023-04-06 ASSESSMENT — ENCOUNTER SYMPTOMS
VOMITING: 0
DIARRHEA: 0
CHILLS: 0
COUGH: 0
PALPITATIONS: 0
FATIGUE: 0
CONSTIPATION: 0
NAUSEA: 0
FEVER: 0
ABDOMINAL PAIN: 0
DIFFICULTY URINATING: 0
SHORTNESS OF BREATH: 0

## 2023-04-06 NOTE — LETTER
Patient: Victorino Bejarano  : 10/25/1924    Encounter Date: 2023    Subjective  Victorino Bejarano is a 89 y.o. male Here for weekly skilled visit.   HPI  Health problems reviewed and no acute concerns.  Participating in therapy and feeling stronger.  Eating and drinking well.  he denies any complaints of pain.  No concerns per staff.  [place].   He will be completing skilled care and transition to long term care.        Review of Systems   Constitutional:  Negative for chills, fatigue and fever.   Respiratory:  Negative for cough and shortness of breath.    Cardiovascular:  Negative for chest pain and palpitations.   Gastrointestinal:  Negative for abdominal pain, constipation, diarrhea, nausea and vomiting.   Genitourinary:  Negative for difficulty urinating.       Objective  /68   Pulse 74   Temp 36.5 °C (97.7 °F)   Resp 18   Wt 64.4 kg (142 lb)   SpO2 98%   BMI 20.37 kg/m²     Physical Exam  Constitutional:       General: He is not in acute distress.  HENT:      Head: Normocephalic and atraumatic.   Eyes:      Conjunctiva/sclera: Conjunctivae normal.   Cardiovascular:      Rate and Rhythm: Normal rate. Rhythm irregular.   Pulmonary:      Effort: Pulmonary effort is normal. No respiratory distress.      Breath sounds: Normal breath sounds.   Abdominal:      General: Bowel sounds are normal. There is no distension.      Palpations: Abdomen is soft.      Tenderness: There is no abdominal tenderness.   Musculoskeletal:         General: Normal range of motion.      Right lower leg: No edema.      Left lower leg: No edema.   Skin:     General: Skin is warm and dry.   Neurological:      General: No focal deficit present.      Mental Status: He is alert.      Comments: Slightly forgetful, poor historian.     Psychiatric:         Mood and Affect: Mood normal.         Behavior: Behavior normal.         Assessment/Plan  Problem List Items Addressed This Visit          Nervous    Mild late onset Alzheimer's  dementia without behavioral disturbance, psychotic disturbance, mood disturbance, or anxiety (CMS/[facility]) - Primary     He will be completing skilled care and will be transitioning to long term care.                 Circulatory    Cardiac pacemaker     Hx complete heart block          Benign essential HTN     Not on meds,  continue to monitor and initate meds based on clinical course             Genitourinary    Renal insufficiency     monitor with routine labs.              Infectious/Inflammatory    COVID-19     He has completed COVID isolation and denies any cough, fever, chills .          labs/meds/orders reviewed  staff to monitor and notify for any changes.  continue with therapy as able until complete and then will transition to long term care.   Next labs 6/23 and prn        Electronically Signed By: JONNY Camarillo-CNP   4/6/23  3:10 PM

## 2023-04-06 NOTE — PROGRESS NOTES
Subjective   Victorino Bejarano is a 89 y.o. male Here for weekly skilled visit.   HPI  Health problems reviewed and no acute concerns.  Participating in therapy and feeling stronger.  Eating and drinking well.  he denies any complaints of pain.  No concerns per staff.  [place].   He will be completing skilled care and transition to long term care.        Review of Systems   Constitutional:  Negative for chills, fatigue and fever.   Respiratory:  Negative for cough and shortness of breath.    Cardiovascular:  Negative for chest pain and palpitations.   Gastrointestinal:  Negative for abdominal pain, constipation, diarrhea, nausea and vomiting.   Genitourinary:  Negative for difficulty urinating.       Objective   /68   Pulse 74   Temp 36.5 °C (97.7 °F)   Resp 18   Wt 64.4 kg (142 lb)   SpO2 98%   BMI 20.37 kg/m²     Physical Exam  Constitutional:       General: He is not in acute distress.  HENT:      Head: Normocephalic and atraumatic.   Eyes:      Conjunctiva/sclera: Conjunctivae normal.   Cardiovascular:      Rate and Rhythm: Normal rate. Rhythm irregular.   Pulmonary:      Effort: Pulmonary effort is normal. No respiratory distress.      Breath sounds: Normal breath sounds.   Abdominal:      General: Bowel sounds are normal. There is no distension.      Palpations: Abdomen is soft.      Tenderness: There is no abdominal tenderness.   Musculoskeletal:         General: Normal range of motion.      Right lower leg: No edema.      Left lower leg: No edema.   Skin:     General: Skin is warm and dry.   Neurological:      General: No focal deficit present.      Mental Status: He is alert.      Comments: Slightly forgetful, poor historian.     Psychiatric:         Mood and Affect: Mood normal.         Behavior: Behavior normal.         Assessment/Plan   Problem List Items Addressed This Visit          Nervous    Mild late onset Alzheimer's dementia without behavioral disturbance, psychotic disturbance, mood  disturbance, or anxiety (CMS/[facility]) - Primary     He will be completing skilled care and will be transitioning to long term care.                 Circulatory    Cardiac pacemaker     Hx complete heart block          Benign essential HTN     Not on meds,  continue to monitor and initate meds based on clinical course             Genitourinary    Renal insufficiency     monitor with routine labs.              Infectious/Inflammatory    COVID-19     He has completed COVID isolation and denies any cough, fever, chills .          labs/meds/orders reviewed  staff to monitor and notify for any changes.  continue with therapy as able until complete and then will transition to long term care.   Next labs 6/23 and prn

## 2023-04-20 ENCOUNTER — TELEPHONE (OUTPATIENT)
Dept: PRIMARY CARE | Facility: CLINIC | Age: 88
End: 2023-04-20
Payer: MEDICARE

## 2023-04-20 NOTE — TELEPHONE ENCOUNTER
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  Spent 30 days in rehab and now a permanent resident.  He has friends and seems happy and his energy is back.  I know you tried to order a wheel chair from [facility] but Krystal called and said she needs notes etc on this request.  The type of wheelchair is called \"transport chair\" Item #[id number].  They run about $159.99.  Can you have your office work on this again.  Sorry I know this was done in October 2022 but [facility] called and said it is not complete and needs to say \"transport chair\".  Hope you are well.  Sydney     "

## 2023-04-26 ENCOUNTER — TELEPHONE (OUTPATIENT)
Dept: PRIMARY CARE | Facility: CLINIC | Age: 88
End: 2023-04-26
Payer: MEDICARE

## 2023-04-26 NOTE — TELEPHONE ENCOUNTER
Krystal from [facility] called and they need chart notes saying a transport chair was discussed at his last appointment or medicare will not cover chair. I dont see anything.     Needs to be faxed to Krystal [phone]

## 2023-05-05 ENCOUNTER — NURSING HOME VISIT (OUTPATIENT)
Dept: POST ACUTE CARE | Facility: EXTERNAL LOCATION | Age: 88
End: 2023-05-05
Payer: MEDICARE

## 2023-05-05 DIAGNOSIS — N28.9 RENAL INSUFFICIENCY: ICD-10-CM

## 2023-05-05 DIAGNOSIS — I10 BENIGN ESSENTIAL HTN: ICD-10-CM

## 2023-05-05 DIAGNOSIS — E83.52 HYPERCALCEMIA: Primary | ICD-10-CM

## 2023-05-05 DIAGNOSIS — E44.1 MILD PROTEIN-CALORIE MALNUTRITION (MULTI): ICD-10-CM

## 2023-05-05 DIAGNOSIS — I73.9 PAD (PERIPHERAL ARTERY DISEASE) (CMS-HCC): ICD-10-CM

## 2023-05-05 PROCEDURE — 99309 SBSQ NF CARE MODERATE MDM 30: CPT | Performed by: INTERNAL MEDICINE

## 2023-05-05 NOTE — LETTER
Patient: Victorino Bejarano  : 10/25/1924    Encounter Date: 2023    Subjective  Patient ID: Victorino Bejarano is a 89 y.o. male who is acute skilled care being seen and evaluated for multiple medical problems.    HPI   89-year-old male patient sitting in his recliner chair watching TV in no distress whatsoever.  He denies any complaints to me at this time.  He has no shortness of breath or chest pain.  The patient tells me that for the time being and wishes to stay here at the extended care facility.  Nursing has no adverse events reported to me.    Current high risk medication:  Maxide  Valtrex  Plavix  Flomax    Laboratory examination from 2023:  Albumin 3.1  Calcium 11.4 which corrects to about 12  Liver injury test normal  Creatinine 1.31  Potassium 4.7  Bicarbonate 22    Review of Systems   Constitutional:  Negative for chills, diaphoresis and fever.   Respiratory:  Negative for cough and shortness of breath.    Cardiovascular:  Negative for chest pain and leg swelling.   Gastrointestinal:  Negative for constipation, diarrhea, nausea and vomiting.   Musculoskeletal:  Negative for joint swelling and myalgias.       Objective  /66   Pulse 82   Temp 36.6 °C (97.8 °F)   Resp 18   Wt 64.4 kg (142 lb)   SpO2 97%   BMI 20.37 kg/m²     Physical Exam  Constitutional:       General: He is not in acute distress.  HENT:      Head: Normocephalic and atraumatic.   Eyes:      Conjunctiva/sclera: Conjunctivae normal.   Cardiovascular:      Rate and Rhythm: Normal rate. Rhythm irregular.   Pulmonary:      Effort: Pulmonary effort is normal. No respiratory distress.      Breath sounds: Normal breath sounds.   Abdominal:      General: Bowel sounds are normal. There is no distension.      Palpations: Abdomen is soft.      Tenderness: There is no abdominal tenderness.   Musculoskeletal:         General: Normal range of motion.      Right lower leg: No edema.      Left lower leg: No edema.   Skin:      General: Skin is warm and dry.   Neurological:      General: No focal deficit present.      Mental Status: He is alert.      Comments: Slightly forgetful, poor historian.     Psychiatric:         Mood and Affect: Mood normal.         Behavior: Behavior normal.         Assessment/Plan  Problem List Items Addressed This Visit          Circulatory    PAD (peripheral artery disease) (CMS/HCC)    Benign essential HTN       Genitourinary    Renal insufficiency       Endocrine/Metabolic    Protein calorie malnutrition (CMS/HCC)       Other    Hypercalcemia - Primary       A.  We will continue with rehabilitative restorative and supportive care as the patient tolerates    B.  Laboratory examinations will be checked for calcium vitamin D magnesium phosphorus and intact parathyroid hormone due to the patient's hypercalcemia.    C.  Disposition will be determined pending response to rehabilitation overall assessment patient safety awareness however it is my impression that the patient is not anxious to go home.    D.  This patient's prognosis is guarded.      Electronically Signed By: Saurabh Luna MD   5/9/23 11:35 AM

## 2023-05-08 VITALS
DIASTOLIC BLOOD PRESSURE: 66 MMHG | BODY MASS INDEX: 20.37 KG/M2 | SYSTOLIC BLOOD PRESSURE: 130 MMHG | OXYGEN SATURATION: 97 % | WEIGHT: 142 LBS | TEMPERATURE: 97.8 F | RESPIRATION RATE: 18 BRPM | HEART RATE: 82 BPM

## 2023-05-08 NOTE — PROGRESS NOTES
Subjective   Patient ID: Victorino Bejarano is a 89 y.o. male who is acute skilled care being seen and evaluated for multiple medical problems.    HPI   89-year-old male patient sitting in his recliner chair watching TV in no distress whatsoever.  He denies any complaints to me at this time.  He has no shortness of breath or chest pain.  The patient tells me that for the time being and wishes to stay here at the extended care facility.  Nursing has no adverse events reported to me.    Current high risk medication:  Maxide  Valtrex  Plavix  Flomax    Laboratory examination from March 27, 2023:  Albumin 3.1  Calcium 11.4 which corrects to about 12  Liver injury test normal  Creatinine 1.31  Potassium 4.7  Bicarbonate 22    Review of Systems   Constitutional:  Negative for chills, diaphoresis and fever.   Respiratory:  Negative for cough and shortness of breath.    Cardiovascular:  Negative for chest pain and leg swelling.   Gastrointestinal:  Negative for constipation, diarrhea, nausea and vomiting.   Musculoskeletal:  Negative for joint swelling and myalgias.       Objective   /66   Pulse 82   Temp 36.6 °C (97.8 °F)   Resp 18   Wt 64.4 kg (142 lb)   SpO2 97%   BMI 20.37 kg/m²     Physical Exam  Constitutional:       General: He is not in acute distress.  HENT:      Head: Normocephalic and atraumatic.   Eyes:      Conjunctiva/sclera: Conjunctivae normal.   Cardiovascular:      Rate and Rhythm: Normal rate. Rhythm irregular.   Pulmonary:      Effort: Pulmonary effort is normal. No respiratory distress.      Breath sounds: Normal breath sounds.   Abdominal:      General: Bowel sounds are normal. There is no distension.      Palpations: Abdomen is soft.      Tenderness: There is no abdominal tenderness.   Musculoskeletal:         General: Normal range of motion.      Right lower leg: No edema.      Left lower leg: No edema.   Skin:     General: Skin is warm and dry.   Neurological:      General: No focal  deficit present.      Mental Status: He is alert.      Comments: Slightly forgetful, poor historian.     Psychiatric:         Mood and Affect: Mood normal.         Behavior: Behavior normal.         Assessment/Plan   Problem List Items Addressed This Visit          Circulatory    PAD (peripheral artery disease) (CMS/[facility])    Benign essential HTN       Genitourinary    Renal insufficiency       Endocrine/Metabolic    Protein calorie malnutrition (CMS/[facility])       Other    Hypercalcemia - Primary       A.  We will continue with rehabilitative restorative and supportive care as the patient tolerates    B.  Laboratory examinations will be checked for calcium vitamin D magnesium phosphorus and intact parathyroid hormone due to the patient's hypercalcemia.    C.  Disposition will be determined pending response to rehabilitation overall assessment patient safety awareness however it is my impression that the patient is not anxious to go home.    D.  This patient's prognosis is guarded.

## 2023-05-09 PROBLEM — E83.52 HYPERCALCEMIA: Status: ACTIVE | Noted: 2023-05-09

## 2023-05-09 ASSESSMENT — ENCOUNTER SYMPTOMS
VOMITING: 0
JOINT SWELLING: 0
SHORTNESS OF BREATH: 0
CONSTIPATION: 0
DIARRHEA: 0
NAUSEA: 0
FEVER: 0
COUGH: 0
DIAPHORESIS: 0
CHILLS: 0
MYALGIAS: 0

## 2023-05-17 ENCOUNTER — NURSING HOME VISIT (OUTPATIENT)
Dept: POST ACUTE CARE | Facility: EXTERNAL LOCATION | Age: 88
End: 2023-05-17
Payer: MEDICARE

## 2023-05-17 VITALS
HEART RATE: 82 BPM | TEMPERATURE: 97.8 F | DIASTOLIC BLOOD PRESSURE: 66 MMHG | BODY MASS INDEX: 20.37 KG/M2 | OXYGEN SATURATION: 97 % | SYSTOLIC BLOOD PRESSURE: 130 MMHG | WEIGHT: 142 LBS | RESPIRATION RATE: 18 BRPM

## 2023-05-17 DIAGNOSIS — E83.52 HYPERCALCEMIA: ICD-10-CM

## 2023-05-17 DIAGNOSIS — I10 BENIGN ESSENTIAL HTN: ICD-10-CM

## 2023-05-17 DIAGNOSIS — G30.1 MILD LATE ONSET ALZHEIMER'S DEMENTIA WITHOUT BEHAVIORAL DISTURBANCE, PSYCHOTIC DISTURBANCE, MOOD DISTURBANCE, OR ANXIETY (MULTI): Primary | ICD-10-CM

## 2023-05-17 DIAGNOSIS — Z95.0 CARDIAC PACEMAKER: ICD-10-CM

## 2023-05-17 DIAGNOSIS — F02.A0 MILD LATE ONSET ALZHEIMER'S DEMENTIA WITHOUT BEHAVIORAL DISTURBANCE, PSYCHOTIC DISTURBANCE, MOOD DISTURBANCE, OR ANXIETY (MULTI): Primary | ICD-10-CM

## 2023-05-17 PROCEDURE — 99309 SBSQ NF CARE MODERATE MDM 30: CPT | Performed by: NURSE PRACTITIONER

## 2023-05-17 ASSESSMENT — ENCOUNTER SYMPTOMS
SHORTNESS OF BREATH: 0
NAUSEA: 0
VOMITING: 0
DIFFICULTY URINATING: 0
FATIGUE: 0
DIARRHEA: 0
COUGH: 0
CHILLS: 0
ABDOMINAL PAIN: 0
FEVER: 0
CONSTIPATION: 0
PALPITATIONS: 0

## 2023-05-17 NOTE — PROGRESS NOTES
Subjective   Victorino Bejarano is a 89 y.o. male Here for routine monthly visit.   HPI  Health problems reviewed and no acute concerns.   Eating and drinking well, he has gained weight since admission. .  he denies any complaints of pain.  No concerns per staff.  [place].   Hypercalcemia noted on labs    Review of Systems   Constitutional:  Negative for chills, fatigue and fever.   Respiratory:  Negative for cough and shortness of breath.    Cardiovascular:  Negative for chest pain and palpitations.   Gastrointestinal:  Negative for abdominal pain, constipation, diarrhea, nausea and vomiting.   Genitourinary:  Negative for difficulty urinating.       Objective   /66   Pulse 82   Temp 36.6 °C (97.8 °F)   Resp 18   Wt 64.4 kg (142 lb)   SpO2 97%   BMI 20.37 kg/m²     Physical Exam  Constitutional:       General: He is not in acute distress.  HENT:      Head: Normocephalic and atraumatic.   Eyes:      Conjunctiva/sclera: Conjunctivae normal.   Cardiovascular:      Rate and Rhythm: Normal rate. Rhythm irregular.   Pulmonary:      Effort: Pulmonary effort is normal. No respiratory distress.      Breath sounds: Normal breath sounds.   Abdominal:      General: Bowel sounds are normal. There is no distension.      Palpations: Abdomen is soft.      Tenderness: There is no abdominal tenderness.   Musculoskeletal:         General: Normal range of motion.      Right lower leg: No edema.      Left lower leg: No edema.   Skin:     General: Skin is warm and dry.   Neurological:      General: No focal deficit present.      Mental Status: He is alert.      Comments: Slightly forgetful, poor historian.     Psychiatric:         Mood and Affect: Mood normal.         Behavior: Behavior normal.         Assessment/Plan   Problem List Items Addressed This Visit          Nervous    Mild late onset Alzheimer's dementia without behavioral disturbance, psychotic disturbance, mood disturbance, or anxiety (CMS/HCC) - Primary     He has  transitioned to long term care.  Mild, not on meds.             Circulatory    Cardiac pacemaker     Hx complete heart block          Benign essential HTN     Not on meds,  continue to monitor and initate meds based on clinical course             Other    Hypercalcemia     Check BMP, vit D, PTH intact, phosphorous, magnesium             labs/meds/orders reviewed  staff to monitor and notify for any changes.   Next labs 6/23 and prn

## 2023-05-17 NOTE — LETTER
Patient: Victorino Bejarano  : 10/25/1924    Encounter Date: 2023    Subjective  Victorino Bejarano is a 89 y.o. male Here for routine monthly visit.   HPI  Health problems reviewed and no acute concerns.   Eating and drinking well, he has gained weight since admission. .  he denies any complaints of pain.  No concerns per staff.  [place].   Hypercalcemia noted on labs    Review of Systems   Constitutional:  Negative for chills, fatigue and fever.   Respiratory:  Negative for cough and shortness of breath.    Cardiovascular:  Negative for chest pain and palpitations.   Gastrointestinal:  Negative for abdominal pain, constipation, diarrhea, nausea and vomiting.   Genitourinary:  Negative for difficulty urinating.       Objective  /66   Pulse 82   Temp 36.6 °C (97.8 °F)   Resp 18   Wt 64.4 kg (142 lb)   SpO2 97%   BMI 20.37 kg/m²     Physical Exam  Constitutional:       General: He is not in acute distress.  HENT:      Head: Normocephalic and atraumatic.   Eyes:      Conjunctiva/sclera: Conjunctivae normal.   Cardiovascular:      Rate and Rhythm: Normal rate. Rhythm irregular.   Pulmonary:      Effort: Pulmonary effort is normal. No respiratory distress.      Breath sounds: Normal breath sounds.   Abdominal:      General: Bowel sounds are normal. There is no distension.      Palpations: Abdomen is soft.      Tenderness: There is no abdominal tenderness.   Musculoskeletal:         General: Normal range of motion.      Right lower leg: No edema.      Left lower leg: No edema.   Skin:     General: Skin is warm and dry.   Neurological:      General: No focal deficit present.      Mental Status: He is alert.      Comments: Slightly forgetful, poor historian.     Psychiatric:         Mood and Affect: Mood normal.         Behavior: Behavior normal.         Assessment/Plan  Problem List Items Addressed This Visit          Nervous    Mild late onset Alzheimer's dementia without behavioral disturbance, psychotic  disturbance, mood disturbance, or anxiety (CMS/HCC) - Primary     He has transitioned to long term care.  Mild, not on meds.             Circulatory    Cardiac pacemaker     Hx complete heart block          Benign essential HTN     Not on meds,  continue to monitor and initate meds based on clinical course             Other    Hypercalcemia     Check BMP, vit D, PTH intact, phosphorous, magnesium             labs/meds/orders reviewed  staff to monitor and notify for any changes.   Next labs 6/23 and prn        Electronically Signed By: JONNY Camarillo-ERICK   5/17/23  2:21 PM

## 2023-06-01 ENCOUNTER — NURSING HOME VISIT (OUTPATIENT)
Dept: POST ACUTE CARE | Facility: EXTERNAL LOCATION | Age: 88
End: 2023-06-01
Payer: MEDICARE

## 2023-06-01 VITALS
TEMPERATURE: 97.8 F | DIASTOLIC BLOOD PRESSURE: 66 MMHG | RESPIRATION RATE: 18 BRPM | SYSTOLIC BLOOD PRESSURE: 130 MMHG | BODY MASS INDEX: 21.81 KG/M2 | OXYGEN SATURATION: 97 % | WEIGHT: 152 LBS | HEART RATE: 82 BPM

## 2023-06-01 DIAGNOSIS — R60.0 LEG EDEMA: Primary | ICD-10-CM

## 2023-06-01 DIAGNOSIS — F02.A0 MILD LATE ONSET ALZHEIMER'S DEMENTIA WITHOUT BEHAVIORAL DISTURBANCE, PSYCHOTIC DISTURBANCE, MOOD DISTURBANCE, OR ANXIETY (MULTI): ICD-10-CM

## 2023-06-01 DIAGNOSIS — I35.0 NONRHEUMATIC AORTIC VALVE STENOSIS: ICD-10-CM

## 2023-06-01 DIAGNOSIS — Z95.0 CARDIAC PACEMAKER: ICD-10-CM

## 2023-06-01 DIAGNOSIS — G30.1 MILD LATE ONSET ALZHEIMER'S DEMENTIA WITHOUT BEHAVIORAL DISTURBANCE, PSYCHOTIC DISTURBANCE, MOOD DISTURBANCE, OR ANXIETY (MULTI): ICD-10-CM

## 2023-06-01 PROCEDURE — 99309 SBSQ NF CARE MODERATE MDM 30: CPT | Performed by: NURSE PRACTITIONER

## 2023-06-01 ASSESSMENT — ENCOUNTER SYMPTOMS
COUGH: 0
VOMITING: 0
SHORTNESS OF BREATH: 0
CHILLS: 0
DIFFICULTY URINATING: 0
FEVER: 0
PALPITATIONS: 0
DIARRHEA: 0
NAUSEA: 0
FATIGUE: 0
ABDOMINAL PAIN: 0
CONSTIPATION: 0

## 2023-06-01 NOTE — ASSESSMENT & PLAN NOTE
Hx previously of dvt, unknown details.  He has new worsening of left lower extremity edema, worse than right that started yesterday.  No pain.  Due to hx will obtain venous duplex to evaluate for dvt

## 2023-06-01 NOTE — PROGRESS NOTES
Subjective   Victorino Bejarano is a 89 y.o. male requested by staff to be evalauted for worsening edema to LLE.    HPI .   Noted to have increased left lower extremity edema, he thinks it started yesterday.  No pain to leg.  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  There are no family members present during time of visit.    he  denies any complaints when asked.  Eating and drinking well.   No concerns per staff.       Review of Systems   Constitutional:  Negative for chills, fatigue and fever.   Respiratory:  Negative for cough and shortness of breath.    Cardiovascular:  Negative for chest pain and palpitations.   Gastrointestinal:  Negative for abdominal pain, constipation, diarrhea, nausea and vomiting.   Genitourinary:  Negative for difficulty urinating.       Objective   /66   Pulse 82   Temp 36.6 °C (97.8 °F)   Resp 18   Wt 68.9 kg (152 lb)   SpO2 97%   BMI 21.81 kg/m²     Physical Exam  Constitutional:       General: He is not in acute distress.  HENT:      Head: Normocephalic and atraumatic.   Eyes:      Conjunctiva/sclera: Conjunctivae normal.   Cardiovascular:      Rate and Rhythm: Normal rate. Rhythm irregular.   Pulmonary:      Effort: Pulmonary effort is normal. No respiratory distress.      Breath sounds: Normal breath sounds.   Abdominal:      General: Bowel sounds are normal. There is no distension.      Palpations: Abdomen is soft.      Tenderness: There is no abdominal tenderness.   Musculoskeletal:         General: Normal range of motion.      Right lower leg: Edema (mild, 1+) present.      Left lower leg: Edema (2+) present.   Skin:     General: Skin is warm and dry.   Neurological:      General: No focal deficit present.      Mental Status: He is alert.      Comments: Slightly forgetful, poor historian.     Psychiatric:         Mood and Affect: Mood normal.         Behavior: Behavior normal.         Assessment/Plan   Problem  List Items Addressed This Visit          Nervous    Mild late onset Alzheimer's dementia without behavioral disturbance, psychotic disturbance, mood disturbance, or anxiety (CMS/HCC)     He has transitioned to long term care.  Mild, not on meds.             Circulatory    Nonrheumatic aortic valve stenosis     Denies any sob, lightheadedness.           Cardiac pacemaker     Hx complete heart block, follow up with EP as scheduled            Musculoskeletal    Leg edema - Primary     Hx previously of dvt, unknown details.  He has new worsening of left lower extremity edema, worse than right that started yesterday.  No pain.  Due to hx will obtain venous duplex to evaluate for dvt        labs/meds/orders reviewed  staff to monitor and notify for any changes.  Venous duplex ordered.

## 2023-06-01 NOTE — LETTER
Patient: Victorino Bejarano  : 10/25/1924    Encounter Date: 2023    Subjective  Victorino Bejarano is a 89 y.o. male requested by staff to be evalauted for worsening edema to LLE.    HPI .   Noted to have increased left lower extremity edema, he thinks it started yesterday.  No pain to leg.  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  There are no family members present during time of visit.    he  denies any complaints when asked.  Eating and drinking well.   No concerns per staff.       Review of Systems   Constitutional:  Negative for chills, fatigue and fever.   Respiratory:  Negative for cough and shortness of breath.    Cardiovascular:  Negative for chest pain and palpitations.   Gastrointestinal:  Negative for abdominal pain, constipation, diarrhea, nausea and vomiting.   Genitourinary:  Negative for difficulty urinating.       Objective  /66   Pulse 82   Temp 36.6 °C (97.8 °F)   Resp 18   Wt 68.9 kg (152 lb)   SpO2 97%   BMI 21.81 kg/m²     Physical Exam  Constitutional:       General: He is not in acute distress.  HENT:      Head: Normocephalic and atraumatic.   Eyes:      Conjunctiva/sclera: Conjunctivae normal.   Cardiovascular:      Rate and Rhythm: Normal rate. Rhythm irregular.   Pulmonary:      Effort: Pulmonary effort is normal. No respiratory distress.      Breath sounds: Normal breath sounds.   Abdominal:      General: Bowel sounds are normal. There is no distension.      Palpations: Abdomen is soft.      Tenderness: There is no abdominal tenderness.   Musculoskeletal:         General: Normal range of motion.      Right lower leg: Edema (mild, 1+) present.      Left lower leg: Edema (2+) present.   Skin:     General: Skin is warm and dry.   Neurological:      General: No focal deficit present.      Mental Status: He is alert.      Comments: Slightly forgetful, poor historian.     Psychiatric:         Mood and Affect: Mood  normal.         Behavior: Behavior normal.         Assessment/Plan  Problem List Items Addressed This Visit          Nervous    Mild late onset Alzheimer's dementia without behavioral disturbance, psychotic disturbance, mood disturbance, or anxiety (CMS/HCC)     He has transitioned to long term care.  Mild, not on meds.             Circulatory    Nonrheumatic aortic valve stenosis     Denies any sob, lightheadedness.           Cardiac pacemaker     Hx complete heart block, follow up with EP as scheduled            Musculoskeletal    Leg edema - Primary     Hx previously of dvt, unknown details.  He has new worsening of left lower extremity edema, worse than right that started yesterday.  No pain.  Due to hx will obtain venous duplex to evaluate for dvt        labs/meds/orders reviewed  staff to monitor and notify for any changes.  Venous duplex ordered.       Electronically Signed By: NIELS Camarillo   6/1/23 11:21 AM

## 2023-06-09 ENCOUNTER — NURSING HOME VISIT (OUTPATIENT)
Dept: POST ACUTE CARE | Facility: EXTERNAL LOCATION | Age: 88
End: 2023-06-09
Payer: MEDICARE

## 2023-06-09 VITALS
BODY MASS INDEX: 21.81 KG/M2 | SYSTOLIC BLOOD PRESSURE: 130 MMHG | TEMPERATURE: 97.8 F | HEART RATE: 82 BPM | WEIGHT: 152 LBS | RESPIRATION RATE: 18 BRPM | DIASTOLIC BLOOD PRESSURE: 66 MMHG | OXYGEN SATURATION: 97 %

## 2023-06-09 DIAGNOSIS — N18.31 STAGE 3A CHRONIC KIDNEY DISEASE (MULTI): ICD-10-CM

## 2023-06-09 DIAGNOSIS — I10 BENIGN ESSENTIAL HTN: ICD-10-CM

## 2023-06-09 DIAGNOSIS — E21.0 PRIMARY HYPERPARATHYROIDISM (MULTI): Primary | ICD-10-CM

## 2023-06-09 DIAGNOSIS — Z95.0 CARDIAC PACEMAKER: ICD-10-CM

## 2023-06-09 PROCEDURE — 99309 SBSQ NF CARE MODERATE MDM 30: CPT | Performed by: INTERNAL MEDICINE

## 2023-06-09 NOTE — PROGRESS NOTES
Subjective   Patient ID: Victorino Bejarano is a 89 y.o. male who is long term resident being seen and evaluated for multiple medical problems.    HPI   This 89-year-old male patient is up and about the facility with his walker and goes outside to enjoy the sunshine and fresh air.  He denies any acute pain or shortness of breath for me at this time.  He appears to have settled in here at the extended care facility and to my knowledge there is no plans in the immediate future for disposition change.  Nursing has no new adverse events reported to me at this time.    Current high risk medication:  Tamsulosin  Plavix  Valacyclovir    Laboratory examination from March 2023 including   albumin 3.1  Calcium 11.4  Potassium 4.7  Creatinine 1.31  Bicarbonate 22    Laboratory examinations that have not been included in the computer chart include by my recollection and intact parathyroid hormone in excess of 700.    Review of Systems   Constitutional:  Negative for chills, diaphoresis and fever.   Respiratory:  Negative for cough and shortness of breath.    Cardiovascular:  Negative for chest pain and leg swelling.   Gastrointestinal:  Negative for constipation, diarrhea, nausea and vomiting.   Musculoskeletal:  Negative for joint swelling and myalgias.       Objective   /66   Pulse 82   Temp 36.6 °C (97.8 °F)   Resp 18   Wt 68.9 kg (152 lb)   SpO2 97%   BMI 21.81 kg/m²     Physical Exam  Constitutional:       General: He is not in acute distress.  HENT:      Head: Normocephalic and atraumatic.   Eyes:      Conjunctiva/sclera: Conjunctivae normal.   Cardiovascular:      Rate and Rhythm: Normal rate. Rhythm irregular.   Pulmonary:      Effort: Pulmonary effort is normal. No respiratory distress.      Breath sounds: Normal breath sounds.   Abdominal:      General: Bowel sounds are normal. There is no distension.      Palpations: Abdomen is soft.      Tenderness: There is no abdominal tenderness.   Musculoskeletal:          General: Normal range of motion.      Right lower leg: Edema (mild, 1+) present.      Left lower leg: Edema (2+) present.   Skin:     General: Skin is warm and dry.   Neurological:      General: No focal deficit present.      Mental Status: He is alert.      Comments: Slightly forgetful, poor historian.     Psychiatric:         Mood and Affect: Mood normal.         Behavior: Behavior normal.         Assessment/Plan   Problem List Items Addressed This Visit       Cardiac pacemaker    Benign essential HTN    Primary hyperparathyroidism (CMS/HCC) - Primary    Stage 3a chronic kidney disease       A.  We will continue with restorative and supportive care as the patient tolerates    B.  The patient's mild to moderate hypercalcemia in the setting extraordinarily elevated parathyroid hormone is consistent with primary hyperparathyroidism and inconsistent with secondary hyperparathyroidism or hypercalcemia from diuretic use.  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    C.  Laboratory examinations will continue to be drawn on an ongoing as-needed basis    D.  The patient's prognosis is guarded.

## 2023-06-09 NOTE — LETTER
Patient: Victorino Bejarano  : 10/25/1924    Encounter Date: 2023    Subjective  Patient ID: Victorino Bejarano is a 89 y.o. male who is long term resident being seen and evaluated for multiple medical problems.    HPI   This 89-year-old male patient is up and about the facility with his walker and goes outside to enjoy the sunshine and fresh air.  He denies any acute pain or shortness of breath for me at this time.  He appears to have settled in here at the extended care facility and to my knowledge there is no plans in the immediate future for disposition change.  Nursing has no new adverse events reported to me at this time.    Current high risk medication:  Tamsulosin  Plavix  Valacyclovir    Laboratory examination from 2023 including   albumin 3.1  Calcium 11.4  Potassium 4.7  Creatinine 1.31  Bicarbonate 22    Laboratory examinations that have not been included in the computer chart include by my recollection and intact parathyroid hormone in excess of 700.    Review of Systems   Constitutional:  Negative for chills, diaphoresis and fever.   Respiratory:  Negative for cough and shortness of breath.    Cardiovascular:  Negative for chest pain and leg swelling.   Gastrointestinal:  Negative for constipation, diarrhea, nausea and vomiting.   Musculoskeletal:  Negative for joint swelling and myalgias.       Objective  /66   Pulse 82   Temp 36.6 °C (97.8 °F)   Resp 18   Wt 68.9 kg (152 lb)   SpO2 97%   BMI 21.81 kg/m²     Physical Exam  Constitutional:       General: He is not in acute distress.  HENT:      Head: Normocephalic and atraumatic.   Eyes:      Conjunctiva/sclera: Conjunctivae normal.   Cardiovascular:      Rate and Rhythm: Normal rate. Rhythm irregular.   Pulmonary:      Effort: Pulmonary effort is normal. No respiratory distress.      Breath sounds: Normal breath sounds.   Abdominal:      General: Bowel sounds are normal. There is no distension.      Palpations: Abdomen is soft.       Tenderness: There is no abdominal tenderness.   Musculoskeletal:         General: Normal range of motion.      Right lower leg: Edema (mild, 1+) present.      Left lower leg: Edema (2+) present.   Skin:     General: Skin is warm and dry.   Neurological:      General: No focal deficit present.      Mental Status: He is alert.      Comments: Slightly forgetful, poor historian.     Psychiatric:         Mood and Affect: Mood normal.         Behavior: Behavior normal.         Assessment/Plan  Problem List Items Addressed This Visit       Cardiac pacemaker    Benign essential HTN    Primary hyperparathyroidism (CMS/HCC) - Primary    Stage 3a chronic kidney disease       A.  We will continue with restorative and supportive care as the patient tolerates    B.  The patient's mild to moderate hypercalcemia in the setting extraordinarily elevated parathyroid hormone is consistent with primary hyperparathyroidism and inconsistent with secondary hyperparathyroidism or hypercalcemia from diuretic use.  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    C.  Laboratory examinations will continue to be drawn on an ongoing as-needed basis    D.  The patient's prognosis is guarded.      Electronically Signed By: Saurabh Luna MD   6/20/23  8:28 AM

## 2023-06-20 PROBLEM — N18.31 CHRONIC KIDNEY DISEASE (CKD) STAGE G3A/A3, MODERATELY DECREASED GLOMERULAR FILTRATION RATE (GFR) BETWEEN 45-59 ML/MIN/1.73 SQUARE METER AND ALBUMINURIA CREATININE RATIO GREATER THAN 300 MG/G * (MULTI): Status: ACTIVE | Noted: 2023-06-20

## 2023-06-20 PROBLEM — N18.31 CHRONIC KIDNEY DISEASE (CKD) STAGE G3A/A3, MODERATELY DECREASED GLOMERULAR FILTRATION RATE (GFR) BETWEEN 45-59 ML/MIN/1.73 SQUARE METER AND ALBUMINURIA CREATININE RATIO GREATER THAN 300 MG/G * (MULTI): Status: RESOLVED | Noted: 2023-06-20 | Resolved: 2023-06-20

## 2023-06-20 PROBLEM — N18.31 STAGE 3A CHRONIC KIDNEY DISEASE (MULTI): Status: ACTIVE | Noted: 2023-06-20

## 2023-06-20 PROBLEM — E21.0 PRIMARY HYPERPARATHYROIDISM (MULTI): Status: ACTIVE | Noted: 2023-06-20

## 2023-06-20 ASSESSMENT — ENCOUNTER SYMPTOMS
DIARRHEA: 0
CONSTIPATION: 0
VOMITING: 0
FEVER: 0
COUGH: 0
CHILLS: 0
DIAPHORESIS: 0
JOINT SWELLING: 0
MYALGIAS: 0
SHORTNESS OF BREATH: 0
NAUSEA: 0

## 2023-07-13 ENCOUNTER — NURSING HOME VISIT (OUTPATIENT)
Dept: POST ACUTE CARE | Facility: EXTERNAL LOCATION | Age: 88
End: 2023-07-13
Payer: MEDICARE

## 2023-07-13 VITALS
HEART RATE: 87 BPM | TEMPERATURE: 98 F | RESPIRATION RATE: 18 BRPM | OXYGEN SATURATION: 98 % | BODY MASS INDEX: 22.38 KG/M2 | WEIGHT: 156 LBS | DIASTOLIC BLOOD PRESSURE: 78 MMHG | SYSTOLIC BLOOD PRESSURE: 130 MMHG

## 2023-07-13 DIAGNOSIS — N18.31 STAGE 3A CHRONIC KIDNEY DISEASE (MULTI): ICD-10-CM

## 2023-07-13 DIAGNOSIS — F02.A0 MILD LATE ONSET ALZHEIMER'S DEMENTIA WITHOUT BEHAVIORAL DISTURBANCE, PSYCHOTIC DISTURBANCE, MOOD DISTURBANCE, OR ANXIETY (MULTI): ICD-10-CM

## 2023-07-13 DIAGNOSIS — I10 BENIGN ESSENTIAL HTN: Primary | ICD-10-CM

## 2023-07-13 DIAGNOSIS — Z95.0 CARDIAC PACEMAKER: ICD-10-CM

## 2023-07-13 DIAGNOSIS — G30.1 MILD LATE ONSET ALZHEIMER'S DEMENTIA WITHOUT BEHAVIORAL DISTURBANCE, PSYCHOTIC DISTURBANCE, MOOD DISTURBANCE, OR ANXIETY (MULTI): ICD-10-CM

## 2023-07-13 PROCEDURE — 99309 SBSQ NF CARE MODERATE MDM 30: CPT | Performed by: NURSE PRACTITIONER

## 2023-07-13 ASSESSMENT — ENCOUNTER SYMPTOMS
DIARRHEA: 0
ABDOMINAL PAIN: 0
CHILLS: 0
CONSTIPATION: 0
FATIGUE: 0
SHORTNESS OF BREATH: 0
PALPITATIONS: 0
NAUSEA: 0
DIFFICULTY URINATING: 0
COUGH: 0
FEVER: 0
VOMITING: 0

## 2023-07-13 NOTE — ASSESSMENT & PLAN NOTE
Buspar was recently started due to anxiety, per staff he has been doing much better since med started.

## 2023-07-13 NOTE — PROGRESS NOTES
Subjective   Victorino Bejarano is a 89 y.o. male Here for routine monthly visit.   HPI  Health problems reviewed and no acute concerns.   Eating and drinking well, he has gained weight since admission. .  he denies any complaints of pain.  No concerns per staff.  [place].    He was recently stated on buspar due to anxiety in setting of dementia, per staff he has not had any outbursts.       Review of Systems   Constitutional:  Negative for chills, fatigue and fever.   Respiratory:  Negative for cough and shortness of breath.    Cardiovascular:  Negative for chest pain and palpitations.   Gastrointestinal:  Negative for abdominal pain, constipation, diarrhea, nausea and vomiting.   Genitourinary:  Negative for difficulty urinating.       Objective   /78   Pulse 87   Temp 36.7 °C (98 °F)   Resp 18   Wt 70.8 kg (156 lb)   SpO2 98%   BMI 22.38 kg/m²     Physical Exam  Constitutional:       General: He is not in acute distress.  HENT:      Head: Normocephalic and atraumatic.   Eyes:      Conjunctiva/sclera: Conjunctivae normal.   Cardiovascular:      Rate and Rhythm: Normal rate. Rhythm irregular.   Pulmonary:      Effort: Pulmonary effort is normal. No respiratory distress.      Breath sounds: Normal breath sounds.   Abdominal:      General: Bowel sounds are normal. There is no distension.      Palpations: Abdomen is soft.      Tenderness: There is no abdominal tenderness.   Musculoskeletal:         General: Normal range of motion.      Right lower leg: No edema.      Left lower leg: No edema.   Skin:     General: Skin is warm and dry.   Neurological:      General: No focal deficit present.      Mental Status: He is alert.      Comments: Slightly forgetful, poor historian.     Psychiatric:         Mood and Affect: Mood normal.         Behavior: Behavior normal.         Assessment/Plan   Problem List Items Addressed This Visit       Cardiac pacemaker     Hx complete heart block, follow up with EP as scheduled          Benign essential HTN - Primary     Not on meds,  continue to monitor and initate meds based on clinical course          Mild late onset Alzheimer's dementia without behavioral disturbance, psychotic disturbance, mood disturbance, or anxiety (CMS/HCC)     Buspar was recently started due to anxiety, per staff he has been doing much better since med started.          Stage 3a chronic kidney disease     monitor with routine labs.            labs/meds/orders reviewed  staff to monitor and notify for any changes.   Next labs 11/23 and prn

## 2023-07-13 NOTE — LETTER
Patient: Victorino Bejarano  : 10/25/1924    Encounter Date: 2023    Subjective  Victorino Bejarano is a 89 y.o. male Here for routine monthly visit.   HPI  Health problems reviewed and no acute concerns.   Eating and drinking well, he has gained weight since admission. .  he denies any complaints of pain.  No concerns per staff.  [place].    He was recently stated on buspar due to anxiety in setting of dementia, per staff he has not had any outbursts.       Review of Systems   Constitutional:  Negative for chills, fatigue and fever.   Respiratory:  Negative for cough and shortness of breath.    Cardiovascular:  Negative for chest pain and palpitations.   Gastrointestinal:  Negative for abdominal pain, constipation, diarrhea, nausea and vomiting.   Genitourinary:  Negative for difficulty urinating.       Objective  /78   Pulse 87   Temp 36.7 °C (98 °F)   Resp 18   Wt 70.8 kg (156 lb)   SpO2 98%   BMI 22.38 kg/m²     Physical Exam  Constitutional:       General: He is not in acute distress.  HENT:      Head: Normocephalic and atraumatic.   Eyes:      Conjunctiva/sclera: Conjunctivae normal.   Cardiovascular:      Rate and Rhythm: Normal rate. Rhythm irregular.   Pulmonary:      Effort: Pulmonary effort is normal. No respiratory distress.      Breath sounds: Normal breath sounds.   Abdominal:      General: Bowel sounds are normal. There is no distension.      Palpations: Abdomen is soft.      Tenderness: There is no abdominal tenderness.   Musculoskeletal:         General: Normal range of motion.      Right lower leg: No edema.      Left lower leg: No edema.   Skin:     General: Skin is warm and dry.   Neurological:      General: No focal deficit present.      Mental Status: He is alert.      Comments: Slightly forgetful, poor historian.     Psychiatric:         Mood and Affect: Mood normal.         Behavior: Behavior normal.         Assessment/Plan  Problem List Items Addressed This Visit       Cardiac  pacemaker     Hx complete heart block, follow up with EP as scheduled         Benign essential HTN - Primary     Not on meds,  continue to monitor and initate meds based on clinical course          Mild late onset Alzheimer's dementia without behavioral disturbance, psychotic disturbance, mood disturbance, or anxiety (CMS/HCC)     Buspar was recently started due to anxiety, per staff he has been doing much better since med started.          Stage 3a chronic kidney disease     monitor with routine labs.            labs/meds/orders reviewed  staff to monitor and notify for any changes.   Next labs 11/23 and prn        Electronically Signed By: JONNY Camarillo-ERICK   7/13/23  1:55 PM

## 2023-07-14 ENCOUNTER — NURSING HOME VISIT (OUTPATIENT)
Dept: POST ACUTE CARE | Facility: EXTERNAL LOCATION | Age: 88
End: 2023-07-14
Payer: MEDICARE

## 2023-07-14 DIAGNOSIS — G30.1 MILD LATE ONSET ALZHEIMER'S DEMENTIA WITHOUT BEHAVIORAL DISTURBANCE, PSYCHOTIC DISTURBANCE, MOOD DISTURBANCE, OR ANXIETY (MULTI): ICD-10-CM

## 2023-07-14 DIAGNOSIS — I10 BENIGN ESSENTIAL HTN: Primary | ICD-10-CM

## 2023-07-14 DIAGNOSIS — I73.9 PAD (PERIPHERAL ARTERY DISEASE) (CMS-HCC): ICD-10-CM

## 2023-07-14 DIAGNOSIS — R60.0 LEG EDEMA: ICD-10-CM

## 2023-07-14 DIAGNOSIS — F02.A0 MILD LATE ONSET ALZHEIMER'S DEMENTIA WITHOUT BEHAVIORAL DISTURBANCE, PSYCHOTIC DISTURBANCE, MOOD DISTURBANCE, OR ANXIETY (MULTI): ICD-10-CM

## 2023-07-14 PROCEDURE — 99308 SBSQ NF CARE LOW MDM 20: CPT | Performed by: INTERNAL MEDICINE

## 2023-07-14 NOTE — LETTER
Patient: Victorino Bejarano  : 10/25/1924    Encounter Date: 2023    Subjective  Patient ID: Victorino Bejarano is a 89 y.o. male who is long term resident being seen and evaluated for multiple medical problems.    HPI   This 89-year-old male patient is resting comfortably in his room in no distress.  The patient reported to the nurses that he had some foot pain on the left foot.  He tells me that he was wearing a tight stocking and that once the stocking was removed his foot pain went away.  He is now without discomfort and is currently without complaint.  Nursing has no adverse events reported to me at this time.    Current high risk medication:  Tamsulosin  Plavix  Valacyclovir    Laboratory examination from 2023 reveals albumin 3.1  Liver injury test normal  Creatinine 1.31  Potassium 4.7  Bicarbonate 22  Hemoglobin 11.9     Review of Systems   Constitutional:  Negative for chills, fatigue and fever.   Respiratory:  Negative for cough and shortness of breath.    Cardiovascular:  Negative for chest pain and palpitations.   Gastrointestinal:  Negative for abdominal pain, constipation, diarrhea, nausea and vomiting.   Genitourinary:  Negative for difficulty urinating.       Objective  /78   Pulse 87   Temp 36.7 °C (98 °F)   Resp 18   Wt 70.8 kg (156 lb)   SpO2 98%   BMI 22.38 kg/m²     Physical Exam  Vitals reviewed.   Constitutional:       General: He is not in acute distress.     Appearance: He is not ill-appearing.   HENT:      Head: Normocephalic and atraumatic.   Eyes:      Conjunctiva/sclera: Conjunctivae normal.      Pupils: Pupils are equal, round, and reactive to light.   Neck:      Vascular: No carotid bruit.   Cardiovascular:      Rate and Rhythm: Normal rate and regular rhythm.      Pulses: Normal pulses.      Heart sounds: Normal heart sounds.      No gallop.   Pulmonary:      Effort: Pulmonary effort is normal. No respiratory distress.      Breath sounds: Normal breath sounds.  No wheezing, rhonchi or rales.   Abdominal:      General: Abdomen is flat. Bowel sounds are normal. There is no distension.      Palpations: Abdomen is soft.      Tenderness: There is no abdominal tenderness. There is no guarding or rebound.   Musculoskeletal:         General: Normal range of motion.      Cervical back: Normal range of motion and neck supple.      Right lower leg: No edema.      Left lower leg: No edema.   Skin:     General: Skin is warm and dry.      Findings: No lesion or rash.   Neurological:      General: No focal deficit present.      Mental Status: He is alert.      Comments: Slightly forgetful, poor historian.     Psychiatric:         Mood and Affect: Mood normal.         Behavior: Behavior normal.         Assessment/Plan  Problem List Items Addressed This Visit       PAD (peripheral artery disease) (CMS/HCC)    Benign essential HTN - Primary    Mild late onset Alzheimer's dementia without behavioral disturbance, psychotic disturbance, mood disturbance, or anxiety (CMS/HCC)    Leg edema     A.  We will continue with restorative and supportive care as patient tolerates    B.  The patient knows to not use the tight stockings that he was brought by his family and will elevate his legs as needed to control leg edema in the future.    C.  Laboratory examinations will next be due in September 2023 or as needed    D.  The patient's prognosis is guarded.          Electronically Signed By: Saurabh Luna MD   7/20/23  6:42 PM

## 2023-07-18 VITALS
BODY MASS INDEX: 22.38 KG/M2 | HEART RATE: 87 BPM | WEIGHT: 156 LBS | DIASTOLIC BLOOD PRESSURE: 78 MMHG | SYSTOLIC BLOOD PRESSURE: 130 MMHG | RESPIRATION RATE: 18 BRPM | TEMPERATURE: 98 F | OXYGEN SATURATION: 98 %

## 2023-07-18 NOTE — PROGRESS NOTES
Subjective   Patient ID: Victorino Bejarano is a 89 y.o. male who is long term resident being seen and evaluated for multiple medical problems.    HPI   This 89-year-old male patient is resting comfortably in his room in no distress.  The patient reported to the nurses that he had some foot pain on the left foot.  He tells me that he was wearing a tight stocking and that once the stocking was removed his foot pain went away.  He is now without discomfort and is currently without complaint.  Nursing has no adverse events reported to me at this time.    Current high risk medication:  Tamsulosin  Plavix  Valacyclovir    Laboratory examination from March 2023 reveals albumin 3.1  Liver injury test normal  Creatinine 1.31  Potassium 4.7  Bicarbonate 22  Hemoglobin 11.9     Review of Systems   Constitutional:  Negative for chills, fatigue and fever.   Respiratory:  Negative for cough and shortness of breath.    Cardiovascular:  Negative for chest pain and palpitations.   Gastrointestinal:  Negative for abdominal pain, constipation, diarrhea, nausea and vomiting.   Genitourinary:  Negative for difficulty urinating.       Objective   /78   Pulse 87   Temp 36.7 °C (98 °F)   Resp 18   Wt 70.8 kg (156 lb)   SpO2 98%   BMI 22.38 kg/m²     Physical Exam  Vitals reviewed.   Constitutional:       General: He is not in acute distress.     Appearance: He is not ill-appearing.   HENT:      Head: Normocephalic and atraumatic.   Eyes:      Conjunctiva/sclera: Conjunctivae normal.      Pupils: Pupils are equal, round, and reactive to light.   Neck:      Vascular: No carotid bruit.   Cardiovascular:      Rate and Rhythm: Normal rate and regular rhythm.      Pulses: Normal pulses.      Heart sounds: Normal heart sounds.      No gallop.   Pulmonary:      Effort: Pulmonary effort is normal. No respiratory distress.      Breath sounds: Normal breath sounds. No wheezing, rhonchi or rales.   Abdominal:      General: Abdomen is flat.  Bowel sounds are normal. There is no distension.      Palpations: Abdomen is soft.      Tenderness: There is no abdominal tenderness. There is no guarding or rebound.   Musculoskeletal:         General: Normal range of motion.      Cervical back: Normal range of motion and neck supple.      Right lower leg: No edema.      Left lower leg: No edema.   Skin:     General: Skin is warm and dry.      Findings: No lesion or rash.   Neurological:      General: No focal deficit present.      Mental Status: He is alert.      Comments: Slightly forgetful, poor historian.     Psychiatric:         Mood and Affect: Mood normal.         Behavior: Behavior normal.         Assessment/Plan   Problem List Items Addressed This Visit       PAD (peripheral artery disease) (CMS/HCC)    Benign essential HTN - Primary    Mild late onset Alzheimer's dementia without behavioral disturbance, psychotic disturbance, mood disturbance, or anxiety (CMS/HCC)    Leg edema     A.  We will continue with restorative and supportive care as patient tolerates    B.  The patient knows to not use the tight stockings that he was brought by his family and will elevate his legs as needed to control leg edema in the future.    C.  Laboratory examinations will next be due in September 2023 or as needed    D.  The patient's prognosis is guarded.

## 2023-07-20 ASSESSMENT — ENCOUNTER SYMPTOMS
CHILLS: 0
COUGH: 0
FATIGUE: 0
DIARRHEA: 0
ABDOMINAL PAIN: 0
FEVER: 0
SHORTNESS OF BREATH: 0
NAUSEA: 0
DIFFICULTY URINATING: 0
PALPITATIONS: 0
CONSTIPATION: 0
VOMITING: 0

## 2023-08-22 ENCOUNTER — NURSING HOME VISIT (OUTPATIENT)
Dept: POST ACUTE CARE | Facility: EXTERNAL LOCATION | Age: 88
End: 2023-08-22
Payer: MEDICARE

## 2023-08-22 VITALS
DIASTOLIC BLOOD PRESSURE: 68 MMHG | HEART RATE: 74 BPM | TEMPERATURE: 97.9 F | BODY MASS INDEX: 23.24 KG/M2 | SYSTOLIC BLOOD PRESSURE: 118 MMHG | RESPIRATION RATE: 17 BRPM | OXYGEN SATURATION: 97 % | WEIGHT: 162 LBS

## 2023-08-22 DIAGNOSIS — E83.52 HYPERCALCEMIA: ICD-10-CM

## 2023-08-22 DIAGNOSIS — F02.A0 MILD LATE ONSET ALZHEIMER'S DEMENTIA WITHOUT BEHAVIORAL DISTURBANCE, PSYCHOTIC DISTURBANCE, MOOD DISTURBANCE, OR ANXIETY (MULTI): ICD-10-CM

## 2023-08-22 DIAGNOSIS — I10 BENIGN ESSENTIAL HTN: Primary | ICD-10-CM

## 2023-08-22 DIAGNOSIS — N18.31 STAGE 3A CHRONIC KIDNEY DISEASE (MULTI): ICD-10-CM

## 2023-08-22 DIAGNOSIS — G30.1 MILD LATE ONSET ALZHEIMER'S DEMENTIA WITHOUT BEHAVIORAL DISTURBANCE, PSYCHOTIC DISTURBANCE, MOOD DISTURBANCE, OR ANXIETY (MULTI): ICD-10-CM

## 2023-08-22 DIAGNOSIS — E21.0 PRIMARY HYPERPARATHYROIDISM (MULTI): ICD-10-CM

## 2023-08-22 PROCEDURE — 99308 SBSQ NF CARE LOW MDM 20: CPT | Performed by: INTERNAL MEDICINE

## 2023-08-22 NOTE — PROGRESS NOTES
Subjective   Patient ID: Victorino Bejarano is a 89 y.o. male who is long term resident being seen and evaluated for multiple medical problems.    HPI   This 89-year-old male patient is resting comfortably in his bed in no distress.  He has no complaints of pain or shortness of breath me at this time.  Nursing has no new adverse events reported to me.    Current laboratory examination from March 2023:  Albumin 3.1  Calcium 11.4  AST and ALT normal  Sodium 138  Potassium 4.7  Bicarbonate 22  Creatinine 1.31    Current high risk medication:  Flomax  Plavix    Review of Systems   Constitutional:  Negative for chills, fatigue and fever.   Respiratory:  Negative for cough and shortness of breath.    Cardiovascular:  Negative for chest pain and palpitations.   Gastrointestinal:  Negative for abdominal pain, constipation, diarrhea, nausea and vomiting.   Genitourinary:  Negative for difficulty urinating.       Objective   /68   Pulse 74   Temp 36.6 °C (97.9 °F)   Resp 17   Wt 73.5 kg (162 lb)   SpO2 97%   BMI 23.24 kg/m²     Physical Exam  Vitals reviewed.   Constitutional:       General: He is not in acute distress.     Appearance: He is not ill-appearing.   HENT:      Head: Normocephalic and atraumatic.   Eyes:      Conjunctiva/sclera: Conjunctivae normal.      Pupils: Pupils are equal, round, and reactive to light.   Neck:      Vascular: No carotid bruit.   Cardiovascular:      Rate and Rhythm: Normal rate and regular rhythm.      Pulses: Normal pulses.      Heart sounds: Normal heart sounds.      No gallop.   Pulmonary:      Effort: Pulmonary effort is normal. No respiratory distress.      Breath sounds: Normal breath sounds. No wheezing, rhonchi or rales.   Abdominal:      General: Abdomen is flat. Bowel sounds are normal. There is no distension.      Palpations: Abdomen is soft.      Tenderness: There is no abdominal tenderness. There is no guarding or rebound.   Musculoskeletal:         General: Normal  range of motion.      Cervical back: Normal range of motion and neck supple.      Right lower leg: No edema.      Left lower leg: No edema.   Skin:     General: Skin is warm and dry.      Findings: No lesion or rash.   Neurological:      General: No focal deficit present.      Mental Status: He is alert.      Comments: Slightly forgetful, poor historian.     Psychiatric:         Mood and Affect: Mood normal.         Behavior: Behavior normal.         Assessment/Plan   Problem List Items Addressed This Visit       Benign essential HTN - Primary    Mild late onset Alzheimer's dementia without behavioral disturbance, psychotic disturbance, mood disturbance, or anxiety (CMS/HCC)    Hypercalcemia    Primary hyperparathyroidism (CMS/HCC)    Stage 3a chronic kidney disease (CMS/HCC)     8.  We will continue with restorative and supportive care as patient tolerates    B.  Laboratory terminations will continue to be drawn on an ongoing as-needed basis and should be due in September 2023 or as needed    C.  The patient's prognosis is guarded due to his age.

## 2023-08-22 NOTE — LETTER
Patient: Victorino Bejarano  : 10/25/1924    Encounter Date: 2023    Subjective  Patient ID: Victorino Bejarano is a 89 y.o. male who is long term resident being seen and evaluated for multiple medical problems.    HPI   This 89-year-old male patient is resting comfortably in his bed in no distress.  He has no complaints of pain or shortness of breath me at this time.  Nursing has no new adverse events reported to me.    Current laboratory examination from 2023:  Albumin 3.1  Calcium 11.4  AST and ALT normal  Sodium 138  Potassium 4.7  Bicarbonate 22  Creatinine 1.31    Current high risk medication:  Flomax  Plavix    Review of Systems   Constitutional:  Negative for chills, fatigue and fever.   Respiratory:  Negative for cough and shortness of breath.    Cardiovascular:  Negative for chest pain and palpitations.   Gastrointestinal:  Negative for abdominal pain, constipation, diarrhea, nausea and vomiting.   Genitourinary:  Negative for difficulty urinating.       Objective  /68   Pulse 74   Temp 36.6 °C (97.9 °F)   Resp 17   Wt 73.5 kg (162 lb)   SpO2 97%   BMI 23.24 kg/m²     Physical Exam  Vitals reviewed.   Constitutional:       General: He is not in acute distress.     Appearance: He is not ill-appearing.   HENT:      Head: Normocephalic and atraumatic.   Eyes:      Conjunctiva/sclera: Conjunctivae normal.      Pupils: Pupils are equal, round, and reactive to light.   Neck:      Vascular: No carotid bruit.   Cardiovascular:      Rate and Rhythm: Normal rate and regular rhythm.      Pulses: Normal pulses.      Heart sounds: Normal heart sounds.      No gallop.   Pulmonary:      Effort: Pulmonary effort is normal. No respiratory distress.      Breath sounds: Normal breath sounds. No wheezing, rhonchi or rales.   Abdominal:      General: Abdomen is flat. Bowel sounds are normal. There is no distension.      Palpations: Abdomen is soft.      Tenderness: There is no abdominal tenderness. There  is no guarding or rebound.   Musculoskeletal:         General: Normal range of motion.      Cervical back: Normal range of motion and neck supple.      Right lower leg: No edema.      Left lower leg: No edema.   Skin:     General: Skin is warm and dry.      Findings: No lesion or rash.   Neurological:      General: No focal deficit present.      Mental Status: He is alert.      Comments: Slightly forgetful, poor historian.     Psychiatric:         Mood and Affect: Mood normal.         Behavior: Behavior normal.         Assessment/Plan  Problem List Items Addressed This Visit       Benign essential HTN - Primary    Mild late onset Alzheimer's dementia without behavioral disturbance, psychotic disturbance, mood disturbance, or anxiety (CMS/HCC)    Hypercalcemia    Primary hyperparathyroidism (CMS/HCC)    Stage 3a chronic kidney disease (CMS/HCC)     8.  We will continue with restorative and supportive care as patient tolerates    B.  Laboratory terminations will continue to be drawn on an ongoing as-needed basis and should be due in September 2023 or as needed    C.  The patient's prognosis is guarded due to his age.        Electronically Signed By: Saurabh Luna MD   8/29/23 11:23 AM

## 2023-08-24 ENCOUNTER — TELEPHONE (OUTPATIENT)
Dept: PRIMARY CARE | Facility: CLINIC | Age: 88
End: 2023-08-24
Payer: MEDICARE

## 2023-08-24 NOTE — TELEPHONE ENCOUNTER
Pts daughter calling. Says she is frantic and looking for help. Pt is at nursing home and the house doctor (Jeremy) ordered EKG due to pulse rate fluctuating. They want to make sure pace maker is working. She reached out to Dr. Palm but since the weather is bad the mobile tech never showed yesterday. Vitals are good today but she is nervous. She wants to know if she can take him to our office to have EKG done to make sure pace maker is working. They are suppose to come today but they aren't there yet. [phone]

## 2023-08-25 ENCOUNTER — NURSING HOME VISIT (OUTPATIENT)
Dept: POST ACUTE CARE | Facility: EXTERNAL LOCATION | Age: 88
End: 2023-08-25
Payer: MEDICARE

## 2023-08-25 DIAGNOSIS — Z95.0 CARDIAC PACEMAKER: ICD-10-CM

## 2023-08-25 DIAGNOSIS — R00.1 BRADYCARDIA: ICD-10-CM

## 2023-08-25 DIAGNOSIS — I49.9 IRREGULAR HEARTBEAT: ICD-10-CM

## 2023-08-25 DIAGNOSIS — R42 LIGHTHEADEDNESS: Primary | ICD-10-CM

## 2023-08-25 LAB
ALANINE AMINOTRANSFERASE (SGPT) (U/L) IN SER/PLAS: 10 U/L (ref 10–52)
ALBUMIN (G/DL) IN SER/PLAS: 4.1 G/DL (ref 3.4–5)
ALKALINE PHOSPHATASE (U/L) IN SER/PLAS: 120 U/L (ref 33–136)
ANION GAP IN SER/PLAS: 13 MMOL/L (ref 10–20)
APPEARANCE, URINE: CLEAR
ASPARTATE AMINOTRANSFERASE (SGOT) (U/L) IN SER/PLAS: 12 U/L (ref 9–39)
BASOPHILS (10*3/UL) IN BLOOD BY AUTOMATED COUNT: 0.04 X10E9/L (ref 0–0.1)
BASOPHILS/100 LEUKOCYTES IN BLOOD BY AUTOMATED COUNT: 0.5 % (ref 0–2)
BILIRUBIN TOTAL (MG/DL) IN SER/PLAS: 0.4 MG/DL (ref 0–1.2)
BILIRUBIN, URINE: NEGATIVE
BLOOD, URINE: NEGATIVE
CALCIUM (MG/DL) IN SER/PLAS: 10.9 MG/DL (ref 8.6–10.3)
CARBON DIOXIDE, TOTAL (MMOL/L) IN SER/PLAS: 25 MMOL/L (ref 21–32)
CHLORIDE (MMOL/L) IN SER/PLAS: 106 MMOL/L (ref 98–107)
COLOR, URINE: YELLOW
CREATININE (MG/DL) IN SER/PLAS: 2.03 MG/DL (ref 0.5–1.3)
EOSINOPHILS (10*3/UL) IN BLOOD BY AUTOMATED COUNT: 0.32 X10E9/L (ref 0–0.4)
EOSINOPHILS/100 LEUKOCYTES IN BLOOD BY AUTOMATED COUNT: 4 % (ref 0–6)
ERYTHROCYTE DISTRIBUTION WIDTH (RATIO) BY AUTOMATED COUNT: 14.4 % (ref 11.5–14.5)
ERYTHROCYTE MEAN CORPUSCULAR HEMOGLOBIN CONCENTRATION (G/DL) BY AUTOMATED: 32.7 G/DL (ref 32–36)
ERYTHROCYTE MEAN CORPUSCULAR VOLUME (FL) BY AUTOMATED COUNT: 96 FL (ref 80–100)
ERYTHROCYTES (10*6/UL) IN BLOOD BY AUTOMATED COUNT: 3.7 X10E12/L (ref 4.5–5.9)
GFR MALE: 29 ML/MIN/1.73M2
GLUCOSE (MG/DL) IN SER/PLAS: 92 MG/DL (ref 74–99)
GLUCOSE, URINE: NEGATIVE MG/DL
HEMATOCRIT (%) IN BLOOD BY AUTOMATED COUNT: 35.5 % (ref 41–52)
HEMOGLOBIN (G/DL) IN BLOOD: 11.6 G/DL (ref 13.5–17.5)
IMMATURE GRANULOCYTES/100 LEUKOCYTES IN BLOOD BY AUTOMATED COUNT: 0.3 % (ref 0–0.9)
KETONES, URINE: NEGATIVE MG/DL
LEUKOCYTE ESTERASE, URINE: NEGATIVE
LEUKOCYTES (10*3/UL) IN BLOOD BY AUTOMATED COUNT: 8 X10E9/L (ref 4.4–11.3)
LYMPHOCYTES (10*3/UL) IN BLOOD BY AUTOMATED COUNT: 1.61 X10E9/L (ref 0.8–3)
LYMPHOCYTES/100 LEUKOCYTES IN BLOOD BY AUTOMATED COUNT: 20.1 % (ref 13–44)
MONOCYTES (10*3/UL) IN BLOOD BY AUTOMATED COUNT: 0.68 X10E9/L (ref 0.05–0.8)
MONOCYTES/100 LEUKOCYTES IN BLOOD BY AUTOMATED COUNT: 8.5 % (ref 2–10)
NEUTROPHILS (10*3/UL) IN BLOOD BY AUTOMATED COUNT: 5.33 X10E9/L (ref 1.6–5.5)
NEUTROPHILS/100 LEUKOCYTES IN BLOOD BY AUTOMATED COUNT: 66.6 % (ref 40–80)
NITRITE, URINE: NEGATIVE
NRBC (PER 100 WBCS) BY AUTOMATED COUNT: 0 /100 WBC (ref 0–0)
PH, URINE: 5 (ref 5–8)
PLATELETS (10*3/UL) IN BLOOD AUTOMATED COUNT: 232 X10E9/L (ref 150–450)
POTASSIUM (MMOL/L) IN SER/PLAS: 5.4 MMOL/L (ref 3.5–5.3)
PROTEIN TOTAL: 6.6 G/DL (ref 6.4–8.2)
PROTEIN, URINE: NEGATIVE MG/DL
SODIUM (MMOL/L) IN SER/PLAS: 139 MMOL/L (ref 136–145)
SPECIFIC GRAVITY, URINE: 1.01 (ref 1–1.03)
UREA NITROGEN (MG/DL) IN SER/PLAS: 38 MG/DL (ref 6–23)
UROBILINOGEN, URINE: <2 MG/DL (ref 0–1.9)

## 2023-08-25 PROCEDURE — 99310 SBSQ NF CARE HIGH MDM 45: CPT | Performed by: INTERNAL MEDICINE

## 2023-08-25 NOTE — LETTER
Patient: Victorino Bejarano  : 10/25/1924    Encounter Date: 2023    Subjective  Patient ID: Victorino Bejarano is a 89 y.o. male who is long term resident being seen and evaluated for multiple medical problems.    HPI   Called to see this 89-year-old male patient for recurrent complaints of dizziness.  The patient is sitting up in his chair in the common area in no distress whatsoever.  He reports he feels slightly dizzy at times and then its been bothering him for a few days.  He denies any component of spinning or motion.  He is unable to tell me if he feels like he is going to pass out or not.  Nursing reports that he has not been acting himself over the last several days and has had recurring complaints of this despite being up ambulatory about the facility.    Current medications:  Flomax  Plavix  Maxide  Valacyclovir  Diclofenac  Buspirone    Laboratory examination from May 2023:  Creatinine 1.9  Potassium 4.5  Bicarbonate 23  Calcium 10.9  Magnesium 2.8  Hemoglobin 9.9  Parathyroid hormone 739  Vitamin D 28  TSH 0.4    Review of Systems   Constitutional:  Negative for chills, diaphoresis and fever.   Respiratory:  Negative for cough and shortness of breath.    Cardiovascular:  Negative for chest pain and leg swelling.   Gastrointestinal:  Negative for constipation, diarrhea, nausea and vomiting.   Musculoskeletal:  Negative for joint swelling and myalgias.   Neurological:  Positive for light-headedness.       Objective  /57   Pulse 54   Temp 36.6 °C (97.9 °F)   Resp 16   Wt 73.5 kg (162 lb)   SpO2 97%   BMI 23.24 kg/m²     Physical Exam  Vitals reviewed.   Constitutional:       General: He is not in acute distress.     Appearance: He is not ill-appearing.   HENT:      Head: Normocephalic and atraumatic.   Eyes:      Conjunctiva/sclera: Conjunctivae normal.      Pupils: Pupils are equal, round, and reactive to light.   Neck:      Vascular: No carotid bruit.   Cardiovascular:      Rate and  Rhythm: Normal rate. Rhythm irregular.      Pulses: Normal pulses.      Heart sounds: Normal heart sounds.      No gallop.   Pulmonary:      Effort: Pulmonary effort is normal. No respiratory distress.      Breath sounds: Normal breath sounds. No wheezing, rhonchi or rales.   Abdominal:      General: Abdomen is flat. Bowel sounds are normal. There is no distension.      Palpations: Abdomen is soft.      Tenderness: There is no abdominal tenderness. There is no guarding or rebound.   Musculoskeletal:         General: Normal range of motion.      Cervical back: Normal range of motion and neck supple.      Right lower leg: No edema.      Left lower leg: No edema.   Skin:     General: Skin is warm and dry.      Findings: No lesion or rash.   Neurological:      General: No focal deficit present.      Mental Status: He is alert.      Comments: Slightly forgetful, poor historian.     Psychiatric:         Mood and Affect: Mood normal.         Behavior: Behavior normal.         Assessment/Plan  Problem List Items Addressed This Visit       Bradycardia    Cardiac pacemaker    Lightheadedness - Primary    Irregular heartbeat     8.  At this time we will send laboratory examinations for renal function calcium CBC and urine analysis.  An EKG has been requested as well as orthostatic blood pressures.    B.  Should laboratory examinations be unremarkable and if he does exhibit orthostatic hypotension then we may need to discontinue Flomax and start the patient on finasteride in its place for benign prostatic hyperplasia.    C.  Pending the EKG then the patient may need a pacemaker check to determine whether or not his pacemaker is malfunctioning and contributing to his sensation of lightheadedness    D.  The patient's prognosis is guarded, there should be low threshold for sending the patient to the hospital for further evaluation.        Electronically Signed By: Saurabh Luna MD   8/29/23 10:34 AM

## 2023-08-29 VITALS
WEIGHT: 162 LBS | SYSTOLIC BLOOD PRESSURE: 117 MMHG | RESPIRATION RATE: 16 BRPM | TEMPERATURE: 97.9 F | OXYGEN SATURATION: 97 % | BODY MASS INDEX: 23.24 KG/M2 | HEART RATE: 54 BPM | DIASTOLIC BLOOD PRESSURE: 57 MMHG

## 2023-08-29 PROBLEM — I49.9 IRREGULAR HEARTBEAT: Status: ACTIVE | Noted: 2023-08-29

## 2023-08-29 PROBLEM — R42 LIGHTHEADEDNESS: Status: ACTIVE | Noted: 2023-08-29

## 2023-08-29 ASSESSMENT — ENCOUNTER SYMPTOMS
NAUSEA: 0
NAUSEA: 0
JOINT SWELLING: 0
LIGHT-HEADEDNESS: 1
COUGH: 0
DIFFICULTY URINATING: 0
VOMITING: 0
FATIGUE: 0
MYALGIAS: 0
DIAPHORESIS: 0
COUGH: 0
DIARRHEA: 0
SHORTNESS OF BREATH: 0
SHORTNESS OF BREATH: 0
CONSTIPATION: 0
DIARRHEA: 0
CONSTIPATION: 0
VOMITING: 0
CHILLS: 0
PALPITATIONS: 0
FEVER: 0
CHILLS: 0
FEVER: 0
ABDOMINAL PAIN: 0

## 2023-08-29 NOTE — PROGRESS NOTES
Subjective   Patient ID: Victorino Bejarano is a 89 y.o. male who is long term resident being seen and evaluated for multiple medical problems.    HPI   Called to see this 89-year-old male patient for recurrent complaints of dizziness.  The patient is sitting up in his chair in the common area in no distress whatsoever.  He reports he feels slightly dizzy at times and then its been bothering him for a few days.  He denies any component of spinning or motion.  He is unable to tell me if he feels like he is going to pass out or not.  Nursing reports that he has not been acting himself over the last several days and has had recurring complaints of this despite being up ambulatory about the facility.    Current medications:  Flomax  Plavix  Maxide  Valacyclovir  Diclofenac  Buspirone    Laboratory examination from May 2023:  Creatinine 1.9  Potassium 4.5  Bicarbonate 23  Calcium 10.9  Magnesium 2.8  Hemoglobin 9.9  Parathyroid hormone 739  Vitamin D 28  TSH 0.4    Review of Systems   Constitutional:  Negative for chills, diaphoresis and fever.   Respiratory:  Negative for cough and shortness of breath.    Cardiovascular:  Negative for chest pain and leg swelling.   Gastrointestinal:  Negative for constipation, diarrhea, nausea and vomiting.   Musculoskeletal:  Negative for joint swelling and myalgias.   Neurological:  Positive for light-headedness.       Objective   /57   Pulse 54   Temp 36.6 °C (97.9 °F)   Resp 16   Wt 73.5 kg (162 lb)   SpO2 97%   BMI 23.24 kg/m²     Physical Exam  Vitals reviewed.   Constitutional:       General: He is not in acute distress.     Appearance: He is not ill-appearing.   HENT:      Head: Normocephalic and atraumatic.   Eyes:      Conjunctiva/sclera: Conjunctivae normal.      Pupils: Pupils are equal, round, and reactive to light.   Neck:      Vascular: No carotid bruit.   Cardiovascular:      Rate and Rhythm: Normal rate. Rhythm irregular.      Pulses: Normal pulses.       Heart sounds: Normal heart sounds.      No gallop.   Pulmonary:      Effort: Pulmonary effort is normal. No respiratory distress.      Breath sounds: Normal breath sounds. No wheezing, rhonchi or rales.   Abdominal:      General: Abdomen is flat. Bowel sounds are normal. There is no distension.      Palpations: Abdomen is soft.      Tenderness: There is no abdominal tenderness. There is no guarding or rebound.   Musculoskeletal:         General: Normal range of motion.      Cervical back: Normal range of motion and neck supple.      Right lower leg: No edema.      Left lower leg: No edema.   Skin:     General: Skin is warm and dry.      Findings: No lesion or rash.   Neurological:      General: No focal deficit present.      Mental Status: He is alert.      Comments: Slightly forgetful, poor historian.     Psychiatric:         Mood and Affect: Mood normal.         Behavior: Behavior normal.         Assessment/Plan   Problem List Items Addressed This Visit       Bradycardia    Cardiac pacemaker    Lightheadedness - Primary    Irregular heartbeat     8.  At this time we will send laboratory examinations for renal function calcium CBC and urine analysis.  An EKG has been requested as well as orthostatic blood pressures.    B.  Should laboratory examinations be unremarkable and if he does exhibit orthostatic hypotension then we may need to discontinue Flomax and start the patient on finasteride in its place for benign prostatic hyperplasia.    C.  Pending the EKG then the patient may need a pacemaker check to determine whether or not his pacemaker is malfunctioning and contributing to his sensation of lightheadedness    D.  The patient's prognosis is guarded, there should be low threshold for sending the patient to the hospital for further evaluation.

## 2023-09-05 ENCOUNTER — NURSING HOME VISIT (OUTPATIENT)
Dept: POST ACUTE CARE | Facility: EXTERNAL LOCATION | Age: 88
End: 2023-09-05
Payer: MEDICARE

## 2023-09-05 DIAGNOSIS — F02.A0 MILD LATE ONSET ALZHEIMER'S DEMENTIA WITHOUT BEHAVIORAL DISTURBANCE, PSYCHOTIC DISTURBANCE, MOOD DISTURBANCE, OR ANXIETY (MULTI): ICD-10-CM

## 2023-09-05 DIAGNOSIS — N18.31 STAGE 3A CHRONIC KIDNEY DISEASE (MULTI): ICD-10-CM

## 2023-09-05 DIAGNOSIS — G30.1 MILD LATE ONSET ALZHEIMER'S DEMENTIA WITHOUT BEHAVIORAL DISTURBANCE, PSYCHOTIC DISTURBANCE, MOOD DISTURBANCE, OR ANXIETY (MULTI): ICD-10-CM

## 2023-09-05 DIAGNOSIS — I49.9 IRREGULAR HEARTBEAT: Primary | ICD-10-CM

## 2023-09-05 DIAGNOSIS — I10 BENIGN ESSENTIAL HTN: ICD-10-CM

## 2023-09-05 DIAGNOSIS — Z95.0 CARDIAC PACEMAKER: ICD-10-CM

## 2023-09-05 DIAGNOSIS — E83.52 HYPERCALCEMIA: ICD-10-CM

## 2023-09-05 PROCEDURE — 99309 SBSQ NF CARE MODERATE MDM 30: CPT | Performed by: NURSE PRACTITIONER

## 2023-09-05 NOTE — LETTER
"Patient: Victorino Bejarano  : 10/25/1924    Encounter Date: 2023    Subjective  Victorino Bejarano is a 89 y.o. male Here to follow up on bradycardia, malaise, \"not feeling well\"  HPI  He was noted to have frequent bradycardia and evaluated by cardiology and EP, recent pacemaker check shows functioning device.  He was noted to have frequent PVC's and started on metoprolol through cardiology, per staff sx of malaise have improved since starting metoprolol.  Labs reviewed, mild hypoerkalemia and hypercalcemia.    Pasha is sitting up in chair, states he is feeling better.       Review of Systems   Constitutional:  Negative for chills, fatigue and fever.   Respiratory:  Negative for cough and shortness of breath.    Cardiovascular:  Negative for chest pain and palpitations.   Gastrointestinal:  Negative for abdominal pain, constipation, diarrhea, nausea and vomiting.   Genitourinary:  Negative for difficulty urinating.       Objective  /64   Pulse 67   Temp 36.4 °C (97.5 °F)   Resp 18   Wt 73 kg (161 lb)   SpO2 98%   BMI 23.10 kg/m²     Physical Exam  Constitutional:       General: He is not in acute distress.  HENT:      Head: Normocephalic and atraumatic.   Eyes:      Conjunctiva/sclera: Conjunctivae normal.   Cardiovascular:      Rate and Rhythm: Normal rate. Rhythm irregular.   Pulmonary:      Effort: Pulmonary effort is normal. No respiratory distress.      Breath sounds: Normal breath sounds.   Abdominal:      General: Bowel sounds are normal. There is no distension.      Palpations: Abdomen is soft.      Tenderness: There is no abdominal tenderness.   Musculoskeletal:         General: Normal range of motion.      Right lower leg: No edema.      Left lower leg: No edema.   Skin:     General: Skin is warm and dry.   Neurological:      General: No focal deficit present.      Mental Status: He is alert.      Comments: Slightly forgetful, poor historian.     Psychiatric:         Mood and Affect: Mood " "normal.         Behavior: Behavior normal.         Assessment/Plan  Problem List Items Addressed This Visit       Cardiac pacemaker     Hx complete heart block, follow up with EP as scheduled, recently had pacer check.          Benign essential HTN     Started on metoprolol through cardiology due to PVC's.          Mild late onset Alzheimer's dementia without behavioral disturbance, psychotic disturbance, mood disturbance, or anxiety (CMS/[facility])     Mild.          Hypercalcemia     BMP shows calcium remains elevated at 10.9, was previously as elevated as 11.4.     No further evaluation for hypercalcemia due to age/comorbididities and goals of care (DNRCC)   Also noted to have mld hyperkalemia at 5.4, will repeat BMP.            Stage 3a chronic kidney disease (CMS/[facility])     monitor with routine labs.           Irregular heartbeat - Primary     He had pacemaker check and noted to have freuqent PVC's.  He was c/o intermittent \"not feeling well, tired\" and was started on metoprolol.  His heart rate was at time registering in the 40's and cardiology was contacted, per notes pacemaker check shows functioning device, started metoprolol with goal to decrease pvc's.  Per staff and Pasha he has not had the sx of malaise and not feeling well since starting metoprolol.   He has had a functional decline with his recent sx, will have therapy eval and treat.           labs/meds/orders reviewed  staff to monitor and notify for any changes.   Next labs 11/23 and prn  Repeat BMP due to mild hyperkalemia  Therapy consult placed for eval and treat      Electronically Signed By: JONNY Camarillo-CNP   9/10/23 12:06 PM  "

## 2023-09-05 NOTE — PROGRESS NOTES
"Subjective   Victorino Bejarano is a 89 y.o. male Here to follow up on bradycardia, malaise, \"not feeling well\"  HPI  He was noted to have frequent bradycardia and evaluated by cardiology and EP, recent pacemaker check shows functioning device.  He was noted to have frequent PVC's and started on metoprolol through cardiology, per staff sx of malaise have improved since starting metoprolol.  Labs reviewed, mild hypoerkalemia and hypercalcemia.    Pasha is sitting up in chair, states he is feeling better.       Review of Systems   Constitutional:  Negative for chills, fatigue and fever.   Respiratory:  Negative for cough and shortness of breath.    Cardiovascular:  Negative for chest pain and palpitations.   Gastrointestinal:  Negative for abdominal pain, constipation, diarrhea, nausea and vomiting.   Genitourinary:  Negative for difficulty urinating.       Objective   /64   Pulse 67   Temp 36.4 °C (97.5 °F)   Resp 18   Wt 73 kg (161 lb)   SpO2 98%   BMI 23.10 kg/m²     Physical Exam  Constitutional:       General: He is not in acute distress.  HENT:      Head: Normocephalic and atraumatic.   Eyes:      Conjunctiva/sclera: Conjunctivae normal.   Cardiovascular:      Rate and Rhythm: Normal rate. Rhythm irregular.   Pulmonary:      Effort: Pulmonary effort is normal. No respiratory distress.      Breath sounds: Normal breath sounds.   Abdominal:      General: Bowel sounds are normal. There is no distension.      Palpations: Abdomen is soft.      Tenderness: There is no abdominal tenderness.   Musculoskeletal:         General: Normal range of motion.      Right lower leg: No edema.      Left lower leg: No edema.   Skin:     General: Skin is warm and dry.   Neurological:      General: No focal deficit present.      Mental Status: He is alert.      Comments: Slightly forgetful, poor historian.     Psychiatric:         Mood and Affect: Mood normal.         Behavior: Behavior normal.         Assessment/Plan " "  Problem List Items Addressed This Visit       Cardiac pacemaker     Hx complete heart block, follow up with EP as scheduled, recently had pacer check.          Benign essential HTN     Started on metoprolol through cardiology due to PVC's.          Mild late onset Alzheimer's dementia without behavioral disturbance, psychotic disturbance, mood disturbance, or anxiety (CMS/[facility])     Mild.          Hypercalcemia     BMP shows calcium remains elevated at 10.9, was previously as elevated as 11.4.     No further evaluation for hypercalcemia due to age/comorbididities and goals of care (DNRCC)   Also noted to have mld hyperkalemia at 5.4, will repeat BMP.            Stage 3a chronic kidney disease (CMS/[facility])     monitor with routine labs.           Irregular heartbeat - Primary     He had pacemaker check and noted to have freuqent PVC's.  He was c/o intermittent \"not feeling well, tired\" and was started on metoprolol.  His heart rate was at time registering in the 40's and cardiology was contacted, per notes pacemaker check shows functioning device, started metoprolol with goal to decrease pvc's.  Per staff and Pasha he has not had the sx of malaise and not feeling well since starting metoprolol.   He has had a functional decline with his recent sx, will have therapy eval and treat.           labs/meds/orders reviewed  staff to monitor and notify for any changes.   Next labs 11/23 and prn  Repeat BMP due to mild hyperkalemia  Therapy consult placed for eval and treat    "

## 2023-09-10 VITALS
RESPIRATION RATE: 18 BRPM | DIASTOLIC BLOOD PRESSURE: 64 MMHG | OXYGEN SATURATION: 98 % | BODY MASS INDEX: 23.1 KG/M2 | WEIGHT: 161 LBS | SYSTOLIC BLOOD PRESSURE: 124 MMHG | TEMPERATURE: 97.5 F | HEART RATE: 67 BPM

## 2023-09-10 ASSESSMENT — ENCOUNTER SYMPTOMS
DIFFICULTY URINATING: 0
CHILLS: 0
ABDOMINAL PAIN: 0
DIARRHEA: 0
COUGH: 0
FEVER: 0
VOMITING: 0
PALPITATIONS: 0
SHORTNESS OF BREATH: 0
FATIGUE: 0
NAUSEA: 0
CONSTIPATION: 0

## 2023-09-10 NOTE — ASSESSMENT & PLAN NOTE
BMP shows calcium remains elevated at 10.9, was previously as elevated as 11.4.     No further evaluation for hypercalcemia due to age/comorbididities and goals of care (DNRCC)   Also noted to have mld hyperkalemia at 5.4, will repeat BMP.

## 2023-09-10 NOTE — ASSESSMENT & PLAN NOTE
"He had pacemaker check and noted to have freuqent PVC's.  He was c/o intermittent \"not feeling well, tired\" and was started on metoprolol.  His heart rate was at time registering in the 40's and cardiology was contacted, per notes pacemaker check shows functioning device, started metoprolol with goal to decrease pvc's.  Per staff and Pasha he has not had the sx of malaise and not feeling well since starting metoprolol.   He has had a functional decline with his recent sx, will have therapy eval and treat.   "

## 2023-09-19 ENCOUNTER — NURSING HOME VISIT (OUTPATIENT)
Dept: POST ACUTE CARE | Facility: EXTERNAL LOCATION | Age: 88
End: 2023-09-19
Payer: MEDICARE

## 2023-09-19 VITALS
DIASTOLIC BLOOD PRESSURE: 64 MMHG | OXYGEN SATURATION: 98 % | TEMPERATURE: 97.5 F | WEIGHT: 163 LBS | SYSTOLIC BLOOD PRESSURE: 124 MMHG | BODY MASS INDEX: 23.39 KG/M2 | HEART RATE: 64 BPM | RESPIRATION RATE: 17 BRPM

## 2023-09-19 DIAGNOSIS — I10 BENIGN ESSENTIAL HTN: ICD-10-CM

## 2023-09-19 DIAGNOSIS — E21.0 PRIMARY HYPERPARATHYROIDISM (MULTI): ICD-10-CM

## 2023-09-19 DIAGNOSIS — Z95.0 CARDIAC PACEMAKER: ICD-10-CM

## 2023-09-19 DIAGNOSIS — R42 DIZZINESS: Primary | ICD-10-CM

## 2023-09-19 DIAGNOSIS — E83.52 HYPERCALCEMIA: ICD-10-CM

## 2023-09-19 PROCEDURE — 99310 SBSQ NF CARE HIGH MDM 45: CPT | Performed by: INTERNAL MEDICINE

## 2023-09-19 NOTE — LETTER
Patient: Victorino Bejarano  : 10/25/1924    Encounter Date: 2023    Subjective  Patient ID: Victorino Bejarano is a 89 y.o. male who is long term resident being seen and evaluated for multiple medical problems.    HPI   89-year-old male patient up and about the facility per his usual status.  The nurses aides tell me that he uniformly complains of dizziness every morning.  The patient did have his pacemaker checked and he is negative for orthostatic blood pressures.  The patient tells me that he has had vertigo multiple times in the past and believes that perhaps this might be vertigo but is not quite sure.  He does wear hearing aids.    Current high risk medication:  Flomax  Plavix    Laboratory examination from 2023:  Albumin 3.1  Calcium 11.4  Liver injury test normal  Creatinine 1.31  Potassium 4.7  Bicarbonate 22    Review of Systems   Constitutional:  Negative for chills, fatigue and fever.   Respiratory:  Negative for cough and shortness of breath.    Cardiovascular:  Negative for chest pain and palpitations.   Gastrointestinal:  Negative for abdominal pain, constipation, diarrhea, nausea and vomiting.   Genitourinary:  Negative for difficulty urinating.       Objective  /64   Pulse 64   Temp 36.4 °C (97.5 °F)   Resp 17   Wt 73.9 kg (163 lb)   SpO2 98%   BMI 23.39 kg/m²     Physical Exam  Constitutional:       General: He is not in acute distress.  HENT:      Head: Normocephalic and atraumatic.   Eyes:      Conjunctiva/sclera: Conjunctivae normal.   Cardiovascular:      Rate and Rhythm: Normal rate. Rhythm irregular.   Pulmonary:      Effort: Pulmonary effort is normal. No respiratory distress.      Breath sounds: Normal breath sounds.   Abdominal:      General: Bowel sounds are normal. There is no distension.      Palpations: Abdomen is soft.      Tenderness: There is no abdominal tenderness.   Musculoskeletal:         General: Normal range of motion.      Right lower leg: No edema.       Left lower leg: No edema.   Skin:     General: Skin is warm and dry.   Neurological:      General: No focal deficit present.      Mental Status: He is alert.      Comments: Slightly forgetful, poor historian.     Psychiatric:         Mood and Affect: Mood normal.         Behavior: Behavior normal.         Assessment/Plan  Problem List Items Addressed This Visit       Cardiac pacemaker    Benign essential HTN    Hypercalcemia    Primary hyperparathyroidism (CMS/HCC)    Dizziness - Primary     A.  We will continue with restorative and supportive care as patient tolerates    B.  We will ask our nurse practitioner to evaluate the patient's ears for cerumen impaction given his possible vertigo and wearing of hearing aids    C.  If there is no cerumen impaction to explain his dizziness then I would certainly proceed with laboratory examination for calcium and parathyroid hormone.  I would also check an albumin with his calcium to allow for the appropriate calculation of his actual calcium.    D.  It is possible that the patient's hypercalcemia has developed to the point where he may be experiencing some symptoms from it.  If this is the case then there is medication to help control symptomatic hypercalcemia in the setting of primary hyperparathyroidism and this could be initiated here at the [facility].    A.  The patient's prognosis is guarded.        Electronically Signed By: Saurabh Luna MD   9/22/23  5:58 PM

## 2023-09-19 NOTE — PROGRESS NOTES
Subjective   Patient ID: Victorino Bejarano is a 89 y.o. male who is long term resident being seen and evaluated for multiple medical problems.    HPI   89-year-old male patient up and about the facility per his usual status.  The nurses aides tell me that he uniformly complains of dizziness every morning.  The patient did have his pacemaker checked and he is negative for orthostatic blood pressures.  The patient tells me that he has had vertigo multiple times in the past and believes that perhaps this might be vertigo but is not quite sure.  He does wear hearing aids.    Current high risk medication:  Flomax  Plavix    Laboratory examination from March 2023:  Albumin 3.1  Calcium 11.4  Liver injury test normal  Creatinine 1.31  Potassium 4.7  Bicarbonate 22    Review of Systems   Constitutional:  Negative for chills, fatigue and fever.   Respiratory:  Negative for cough and shortness of breath.    Cardiovascular:  Negative for chest pain and palpitations.   Gastrointestinal:  Negative for abdominal pain, constipation, diarrhea, nausea and vomiting.   Genitourinary:  Negative for difficulty urinating.       Objective   /64   Pulse 64   Temp 36.4 °C (97.5 °F)   Resp 17   Wt 73.9 kg (163 lb)   SpO2 98%   BMI 23.39 kg/m²     Physical Exam  Constitutional:       General: He is not in acute distress.  HENT:      Head: Normocephalic and atraumatic.   Eyes:      Conjunctiva/sclera: Conjunctivae normal.   Cardiovascular:      Rate and Rhythm: Normal rate. Rhythm irregular.   Pulmonary:      Effort: Pulmonary effort is normal. No respiratory distress.      Breath sounds: Normal breath sounds.   Abdominal:      General: Bowel sounds are normal. There is no distension.      Palpations: Abdomen is soft.      Tenderness: There is no abdominal tenderness.   Musculoskeletal:         General: Normal range of motion.      Right lower leg: No edema.      Left lower leg: No edema.   Skin:     General: Skin is warm and dry.    Neurological:      General: No focal deficit present.      Mental Status: He is alert.      Comments: Slightly forgetful, poor historian.     Psychiatric:         Mood and Affect: Mood normal.         Behavior: Behavior normal.         Assessment/Plan   Problem List Items Addressed This Visit       Cardiac pacemaker    Benign essential HTN    Hypercalcemia    Primary hyperparathyroidism (CMS/HCC)    Dizziness - Primary     A.  We will continue with restorative and supportive care as patient tolerates    B.  We will ask our nurse practitioner to evaluate the patient's ears for cerumen impaction given his possible vertigo and wearing of hearing aids    C.  If there is no cerumen impaction to explain his dizziness then I would certainly proceed with laboratory examination for calcium and parathyroid hormone.  I would also check an albumin with his calcium to allow for the appropriate calculation of his actual calcium.    D.  It is possible that the patient's hypercalcemia has developed to the point where he may be experiencing some symptoms from it.  If this is the case then there is medication to help control symptomatic hypercalcemia in the setting of primary hyperparathyroidism and this could be initiated here at the extended care facility.    A.  The patient's prognosis is guarded.

## 2023-09-22 ENCOUNTER — TELEPHONE (OUTPATIENT)
Dept: POST ACUTE CARE | Facility: EXTERNAL LOCATION | Age: 88
End: 2023-09-22
Payer: MEDICARE

## 2023-09-22 ASSESSMENT — ENCOUNTER SYMPTOMS
PALPITATIONS: 0
SHORTNESS OF BREATH: 0
FATIGUE: 0
ABDOMINAL PAIN: 0
DIARRHEA: 0
CHILLS: 0
CONSTIPATION: 0
NAUSEA: 0
COUGH: 0
FEVER: 0
VOMITING: 0
DIFFICULTY URINATING: 0

## 2023-09-22 NOTE — TELEPHONE ENCOUNTER
FERNANDO HAWKINS,    REGARDLESS AS TO WHETHER MR. GAINES HAS CERUMEN IMPACTIONS TO EXPLAIN HIS DIZZINESS, COULD YOU PLEASE CHECK CALCIUM AND ALBUMIN.  HIS CALCIUM WAS PRETTY HIGH IN THE SPRING.  IT MAY BE GETTING HIGHER YET AND CONTRIBUTING TO HIS DIZZINESS.  THERE IS SOME MEDICATION THAT CAN BE USED TO CONTROL SYMPTOMATIC HYPERCALCEMIA FROM PRIMARY HYPERPARATHYROIDISM, BUT THE NAME OF THE MED ESCAPES ME...  THX

## 2023-09-26 ENCOUNTER — NURSING HOME VISIT (OUTPATIENT)
Dept: POST ACUTE CARE | Facility: EXTERNAL LOCATION | Age: 88
End: 2023-09-26
Payer: MEDICARE

## 2023-09-26 VITALS
RESPIRATION RATE: 18 BRPM | WEIGHT: 162 LBS | TEMPERATURE: 97.8 F | BODY MASS INDEX: 23.24 KG/M2 | SYSTOLIC BLOOD PRESSURE: 112 MMHG | HEART RATE: 65 BPM | OXYGEN SATURATION: 96 % | DIASTOLIC BLOOD PRESSURE: 61 MMHG

## 2023-09-26 DIAGNOSIS — E21.0 PRIMARY HYPERPARATHYROIDISM (MULTI): ICD-10-CM

## 2023-09-26 DIAGNOSIS — R42 DIZZINESS: ICD-10-CM

## 2023-09-26 DIAGNOSIS — I10 BENIGN ESSENTIAL HTN: Primary | ICD-10-CM

## 2023-09-26 DIAGNOSIS — G30.1 MILD LATE ONSET ALZHEIMER'S DEMENTIA WITHOUT BEHAVIORAL DISTURBANCE, PSYCHOTIC DISTURBANCE, MOOD DISTURBANCE, OR ANXIETY (MULTI): ICD-10-CM

## 2023-09-26 DIAGNOSIS — N18.31 STAGE 3A CHRONIC KIDNEY DISEASE (MULTI): ICD-10-CM

## 2023-09-26 DIAGNOSIS — F02.A0 MILD LATE ONSET ALZHEIMER'S DEMENTIA WITHOUT BEHAVIORAL DISTURBANCE, PSYCHOTIC DISTURBANCE, MOOD DISTURBANCE, OR ANXIETY (MULTI): ICD-10-CM

## 2023-09-26 DIAGNOSIS — E83.52 HYPERCALCEMIA: ICD-10-CM

## 2023-09-26 PROCEDURE — 99309 SBSQ NF CARE MODERATE MDM 30: CPT | Performed by: NURSE PRACTITIONER

## 2023-09-26 ASSESSMENT — ENCOUNTER SYMPTOMS
DIFFICULTY URINATING: 0
CONSTIPATION: 0
PALPITATIONS: 0
FEVER: 0
VOMITING: 0
NAUSEA: 0
DIARRHEA: 0
FATIGUE: 0
COUGH: 0
ABDOMINAL PAIN: 0
CHILLS: 0
SHORTNESS OF BREATH: 0

## 2023-09-26 NOTE — PROGRESS NOTES
Subjective   Victorino Bejarano is a 89 y.o. male Here to follow up on intermittent dizziness.   HPI  He will occasionally complain of dizziness and per staff, Victorino and therapy staff the frequency has decreased.  He is scheduled to see EP next week.  Case also discussed with Dr Luna, will check labs due to hx of hypercalcemia.    Victorino is sitting up in chair, states he is feeling better.  Per staff he has been ambulating more since working with therapy.     Review of Systems   Constitutional:  Negative for chills, fatigue and fever.   Respiratory:  Negative for cough and shortness of breath.    Cardiovascular:  Negative for chest pain and palpitations.   Gastrointestinal:  Negative for abdominal pain, constipation, diarrhea, nausea and vomiting.   Genitourinary:  Negative for difficulty urinating.       Objective   /61   Pulse 65   Temp 36.6 °C (97.8 °F)   Resp 18   Wt 73.5 kg (162 lb)   SpO2 96%   BMI 23.24 kg/m²     Physical Exam  Constitutional:       General: He is not in acute distress.  HENT:      Head: Normocephalic and atraumatic.      Ears:      Comments: Bilateral external auditory canals with exccessive wax, small amount of tympanic membane visualized bilaterally.   Eyes:      Conjunctiva/sclera: Conjunctivae normal.   Cardiovascular:      Rate and Rhythm: Normal rate. Rhythm irregular.   Pulmonary:      Effort: Pulmonary effort is normal. No respiratory distress.      Breath sounds: Normal breath sounds.   Abdominal:      General: Bowel sounds are normal. There is no distension.      Palpations: Abdomen is soft.      Tenderness: There is no abdominal tenderness.   Musculoskeletal:         General: Normal range of motion.      Right lower leg: No edema.      Left lower leg: No edema.   Skin:     General: Skin is warm and dry.   Neurological:      General: No focal deficit present.      Mental Status: He is alert.      Comments: Slightly forgetful, poor historian.     Psychiatric:          Mood and Affect: Mood normal.         Behavior: Behavior normal.         Assessment/Plan   Problem List Items Addressed This Visit       Benign essential HTN - Primary     Started on metoprolol through cardiology due to PVC's.   He is seeing EP next week for follow up.             Mild late onset Alzheimer's dementia without behavioral disturbance, psychotic disturbance, mood disturbance, or anxiety (CMS/HCC)     Mild.          Hypercalcemia     BMP shows calcium remains elevated at 10.9, was previously as elevated as 11.4.     No further evaluation for hypercalcemia due to age/comorbididities and goals of care (DNRCC).  He has been having intermittent dizziness, case discussed with Dr Luna, ears checked and will also repeat BMP and albumin.          Primary hyperparathyroidism (CMS/[facility])     Will recheck BMP to include calcium and also albumin.          Stage 3a chronic kidney disease (CMS/[facility])     monitor with routine labs.           Dizziness     He has intermittent dizzines and per staff and Victorino it seems to have decreased.  He is following up with EP next week.  He also has moderate cerumen and will have staff use debrox for 3 days and then gently irrigate.  Orthostatics were negative.  Also check BMP and albumin due to hypercalcemia          labs/meds/orders reviewed  staff to monitor and notify for any changes.   Next labs 11/23 and prn  Repeat BMP and albumin.

## 2023-09-26 NOTE — ASSESSMENT & PLAN NOTE
BMP shows calcium remains elevated at 10.9, was previously as elevated as 11.4.     No further evaluation for hypercalcemia due to age/comorbididities and goals of care (DNRCC).  He has been having intermittent dizziness, case discussed with Dr Luna, ears checked and will also repeat BMP and albumin.

## 2023-09-26 NOTE — LETTER
Patient: Victorino Bejarano  : 10/25/1924    Encounter Date: 2023    Subjective  Victorino Bejarano is a 89 y.o. male Here to follow up on intermittent dizziness.   HPI  He will occasionally complain of dizziness and per staff, Victorino and therapy staff the frequency has decreased.  He is scheduled to see EP next week.  Case also discussed with Dr uLna, will check labs due to hx of hypercalcemia.    Victorino is sitting up in chair, states he is feeling better.  Per staff he has been ambulating more since working with therapy.     Review of Systems   Constitutional:  Negative for chills, fatigue and fever.   Respiratory:  Negative for cough and shortness of breath.    Cardiovascular:  Negative for chest pain and palpitations.   Gastrointestinal:  Negative for abdominal pain, constipation, diarrhea, nausea and vomiting.   Genitourinary:  Negative for difficulty urinating.       Objective  /61   Pulse 65   Temp 36.6 °C (97.8 °F)   Resp 18   Wt 73.5 kg (162 lb)   SpO2 96%   BMI 23.24 kg/m²     Physical Exam  Constitutional:       General: He is not in acute distress.  HENT:      Head: Normocephalic and atraumatic.      Ears:      Comments: Bilateral external auditory canals with exccessive wax, small amount of tympanic membane visualized bilaterally.   Eyes:      Conjunctiva/sclera: Conjunctivae normal.   Cardiovascular:      Rate and Rhythm: Normal rate. Rhythm irregular.   Pulmonary:      Effort: Pulmonary effort is normal. No respiratory distress.      Breath sounds: Normal breath sounds.   Abdominal:      General: Bowel sounds are normal. There is no distension.      Palpations: Abdomen is soft.      Tenderness: There is no abdominal tenderness.   Musculoskeletal:         General: Normal range of motion.      Right lower leg: No edema.      Left lower leg: No edema.   Skin:     General: Skin is warm and dry.   Neurological:      General: No focal deficit present.      Mental Status: He is alert.       Comments: Slightly forgetful, poor historian.     Psychiatric:         Mood and Affect: Mood normal.         Behavior: Behavior normal.         Assessment/Plan  Problem List Items Addressed This Visit       Benign essential HTN - Primary     Started on metoprolol through cardiology due to PVC's.   He is seeing EP next week for follow up.             Mild late onset Alzheimer's dementia without behavioral disturbance, psychotic disturbance, mood disturbance, or anxiety (CMS/HCC)     Mild.          Hypercalcemia     BMP shows calcium remains elevated at 10.9, was previously as elevated as 11.4.     No further evaluation for hypercalcemia due to age/comorbididities and goals of care (DNRCC).  He has been having intermittent dizziness, case discussed with Dr Luna, ears checked and will also repeat BMP and albumin.          Primary hyperparathyroidism (CMS/[facility])     Will recheck BMP to include calcium and also albumin.          Stage 3a chronic kidney disease (CMS/[facility])     monitor with routine labs.           Dizziness     He has intermittent dizzines and per staff and Victorino it seems to have decreased.  He is following up with EP next week.  He also has moderate cerumen and will have staff use debrox for 3 days and then gently irrigate.  Orthostatics were negative.  Also check BMP and albumin due to hypercalcemia          labs/meds/orders reviewed  staff to monitor and notify for any changes.   Next labs 11/23 and prn  Repeat BMP and albumin.       Electronically Signed By: NIELS Camarillo   9/26/23  2:51 PM

## 2023-09-26 NOTE — TELEPHONE ENCOUNTER
Ordering the labs.  I checked his ears today, moderate amount of cerumen bilaterally,  having staff use debrox.  thanks

## 2023-09-26 NOTE — ASSESSMENT & PLAN NOTE
He has intermittent dizzines and per staff and Victorino it seems to have decreased.  He is following up with EP next week.  He also has moderate cerumen and will have staff use debrox for 3 days and then gently irrigate.  Orthostatics were negative.  Also check BMP and albumin due to hypercalcemia

## 2023-09-26 NOTE — ASSESSMENT & PLAN NOTE
Started on metoprolol through cardiology due to PVC's.   He is seeing EP next week for follow up.

## 2023-10-01 PROBLEM — M15.9 DJD (DEGENERATIVE JOINT DISEASE), MULTIPLE SITES: Status: ACTIVE | Noted: 2023-10-01

## 2023-10-01 PROBLEM — R39.11 URINARY HESITANCY: Status: ACTIVE | Noted: 2023-10-01

## 2023-10-01 PROBLEM — R60.9 PERIPHERAL EDEMA: Status: ACTIVE | Noted: 2023-10-01

## 2023-10-01 PROBLEM — M17.11 RIGHT KNEE DJD: Status: ACTIVE | Noted: 2023-10-01

## 2023-10-01 PROBLEM — N18.9 CHRONIC KIDNEY DISEASE: Status: ACTIVE | Noted: 2023-03-09

## 2023-10-01 PROBLEM — H90.3 BILATERAL HIGH FREQUENCY SENSORINEURAL HEARING LOSS: Status: ACTIVE | Noted: 2023-10-01

## 2023-10-01 PROBLEM — M54.41 CHRONIC LOW BACK PAIN WITH RIGHT-SIDED SCIATICA: Status: ACTIVE | Noted: 2023-10-01

## 2023-10-01 PROBLEM — L21.9 SEBORRHEIC ECZEMA OF SCALP: Status: ACTIVE | Noted: 2023-10-01

## 2023-10-01 PROBLEM — I11.9 HYPERTENSIVE HEART DISEASE WITHOUT HEART FAILURE: Status: ACTIVE | Noted: 2023-03-09

## 2023-10-01 PROBLEM — Z98.49 CATARACT EXTRACTION STATUS, UNSPECIFIED EYE: Status: ACTIVE | Noted: 2023-03-09

## 2023-10-01 PROBLEM — H61.20 IMPACTED CERUMEN: Status: ACTIVE | Noted: 2023-03-21

## 2023-10-01 PROBLEM — K86.2 CYST OF PANCREAS (HHS-HCC): Status: ACTIVE | Noted: 2023-02-09

## 2023-10-01 PROBLEM — R63.4 ABNORMAL WEIGHT LOSS: Status: ACTIVE | Noted: 2023-02-09

## 2023-10-01 PROBLEM — M17.12 LEFT KNEE DJD: Status: ACTIVE | Noted: 2023-10-01

## 2023-10-01 PROBLEM — R64 CACHEXIA (MULTI): Status: ACTIVE | Noted: 2023-03-09

## 2023-10-01 PROBLEM — L02.92 BOIL: Status: ACTIVE | Noted: 2023-10-01

## 2023-10-01 PROBLEM — Z95.5 PRESENCE OF CORONARY ANGIOPLASTY IMPLANT AND GRAFT: Status: ACTIVE | Noted: 2023-03-09

## 2023-10-01 PROBLEM — J18.9 PNEUMONIA: Status: ACTIVE | Noted: 2023-10-01

## 2023-10-01 PROBLEM — A41.9 SEPSIS (MULTI): Status: ACTIVE | Noted: 2023-10-01

## 2023-10-01 PROBLEM — M79.89 LEFT ARM SWELLING: Status: ACTIVE | Noted: 2023-10-01

## 2023-10-01 PROBLEM — R65.10 SIRS (SYSTEMIC INFLAMMATORY RESPONSE SYNDROME) (MULTI): Status: ACTIVE | Noted: 2023-03-09

## 2023-10-01 PROBLEM — L30.9 ECZEMA: Status: ACTIVE | Noted: 2023-10-01

## 2023-10-01 PROBLEM — Z66 DO NOT RESUSCITATE: Status: ACTIVE | Noted: 2023-03-09
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
## 2023-10-01 PROBLEM — Z85.828 PERSONAL HISTORY OF OTHER MALIGNANT NEOPLASM OF SKIN: Status: ACTIVE | Noted: 2023-03-09

## 2023-10-01 PROBLEM — R00.1 SEVERE SINUS BRADYCARDIA: Status: ACTIVE | Noted: 2017-11-15

## 2023-10-01 PROBLEM — F03.918 DEMENTIA WITH BEHAVIORAL PROBLEM (MULTI): Status: ACTIVE | Noted: 2023-10-01

## 2023-10-01 PROBLEM — L02.92 FURUNCLE: Status: ACTIVE | Noted: 2023-10-01

## 2023-10-01 PROBLEM — R41.82 ALTERED MENTAL STATUS: Status: ACTIVE | Noted: 2023-03-08

## 2023-10-01 PROBLEM — M79.89 OTHER SPECIFIED SOFT TISSUE DISORDERS: Status: ACTIVE | Noted: 2022-10-19

## 2023-10-01 PROBLEM — K64.9 HEMORRHOID: Status: ACTIVE | Noted: 2023-10-01

## 2023-10-01 PROBLEM — L89.309 BED SORE ON BUTTOCK: Status: ACTIVE | Noted: 2023-10-01

## 2023-10-01 PROBLEM — G89.29 CHRONIC LOW BACK PAIN WITH RIGHT-SIDED SCIATICA: Status: ACTIVE | Noted: 2023-10-01

## 2023-10-01 PROBLEM — M79.89 HAND SWELLING: Status: ACTIVE | Noted: 2023-10-01

## 2023-10-01 PROBLEM — Z79.02 LONG TERM (CURRENT) USE OF ANTITHROMBOTICS/ANTIPLATELETS: Status: ACTIVE | Noted: 2023-03-09

## 2023-10-01 PROBLEM — R07.9 CHEST PAIN: Status: ACTIVE | Noted: 2022-04-10

## 2023-10-01 PROBLEM — Z86.79 HISTORY OF HYPERTENSION: Status: ACTIVE | Noted: 2023-10-01

## 2023-10-01 PROBLEM — B02.9 SHINGLES: Status: ACTIVE | Noted: 2023-10-01

## 2023-10-01 PROBLEM — R19.4 CHANGE IN BOWEL HABIT: Status: ACTIVE | Noted: 2023-02-09

## 2023-10-01 PROBLEM — Z04.9 CONDITION NOT FOUND: Status: ACTIVE | Noted: 2023-10-01

## 2023-10-01 PROBLEM — A41.9 SEPTICEMIA (MULTI): Status: ACTIVE | Noted: 2023-10-01

## 2023-10-01 PROBLEM — I49.3 FREQUENT PVCS: Status: ACTIVE | Noted: 2023-10-01

## 2023-10-01 PROBLEM — R53.83 LETHARGY: Status: ACTIVE | Noted: 2023-10-01

## 2023-10-01 PROBLEM — M75.50 BURSITIS AND TENDINITIS OF SHOULDER REGION: Status: ACTIVE | Noted: 2023-10-01

## 2023-10-01 PROBLEM — R62.7 ADULT FAILURE TO THRIVE: Status: ACTIVE | Noted: 2023-03-09

## 2023-10-01 PROBLEM — B02.9 HERPES ZOSTER: Status: ACTIVE | Noted: 2023-10-01

## 2023-10-01 PROBLEM — M75.90 BURSITIS AND TENDINITIS OF SHOULDER REGION: Status: ACTIVE | Noted: 2023-10-01

## 2023-10-01 PROBLEM — R74.01 ELEVATION OF LEVELS OF LIVER TRANSAMINASE LEVELS: Status: ACTIVE | Noted: 2023-03-09

## 2023-10-01 PROBLEM — L98.9 SKIN LESION: Status: ACTIVE | Noted: 2023-10-01

## 2023-10-01 PROBLEM — H53.9 VISION DISORDER: Status: ACTIVE | Noted: 2023-10-01

## 2023-10-01 PROBLEM — M19.90 OSTEOARTHRITIS: Status: ACTIVE | Noted: 2023-10-01

## 2023-10-01 PROBLEM — N28.1 CYST OF KIDNEY, ACQUIRED: Status: ACTIVE | Noted: 2023-02-09

## 2023-10-01 PROBLEM — N40.0 BENIGN PROSTATIC HYPERPLASIA WITHOUT LOWER URINARY TRACT SYMPTOMS: Status: ACTIVE | Noted: 2023-02-09

## 2023-10-01 PROBLEM — H92.03 OTALGIA, BILATERAL: Status: ACTIVE | Noted: 2023-10-01

## 2023-10-01 PROBLEM — E66.9 OBESE: Status: ACTIVE | Noted: 2023-10-01

## 2023-10-01 PROBLEM — I48.91 ATRIAL FIBRILLATION (MULTI): Status: ACTIVE | Noted: 2023-10-01

## 2023-10-01 PROBLEM — R15.9 FULL INCONTINENCE OF FECES: Status: ACTIVE | Noted: 2023-03-09

## 2023-10-01 RX ORDER — HYDROCHLOROTHIAZIDE 12.5 MG/1
1 TABLET ORAL DAILY
COMMUNITY
Start: 2022-08-16

## 2023-10-01 RX ORDER — POLYETHYLENE GLYCOL 3350 17 G/17G
1 POWDER, FOR SOLUTION ORAL DAILY
COMMUNITY
Start: 2022-09-21

## 2023-10-01 RX ORDER — BETAMETHASONE DIPROPIONATE 0.5 MG/G
CREAM TOPICAL
COMMUNITY
Start: 2017-08-30

## 2023-10-01 RX ORDER — VALACYCLOVIR HYDROCHLORIDE 500 MG/1
500 TABLET, FILM COATED ORAL DAILY
COMMUNITY
Start: 2010-02-09

## 2023-10-01 RX ORDER — CLOBETASOL PROPIONATE 0.5 MG/G
CREAM TOPICAL
COMMUNITY
Start: 2018-02-14

## 2023-10-01 RX ORDER — DOCUSATE SODIUM 100 MG/1
1 CAPSULE ORAL 2 TIMES DAILY
COMMUNITY
Start: 2022-09-21

## 2023-10-01 RX ORDER — BUSPIRONE HYDROCHLORIDE 5 MG/1
1 TABLET ORAL 3 TIMES DAILY
COMMUNITY
Start: 2023-07-13

## 2023-10-01 RX ORDER — TAMSULOSIN HYDROCHLORIDE 0.4 MG/1
1 CAPSULE ORAL DAILY
COMMUNITY
Start: 2022-12-15

## 2023-10-01 RX ORDER — ACYCLOVIR 50 MG/G
OINTMENT TOPICAL 4 TIMES DAILY
COMMUNITY
Start: 2022-12-06

## 2023-10-01 RX ORDER — LOSARTAN POTASSIUM AND HYDROCHLOROTHIAZIDE 12.5; 5 MG/1; MG/1
1 TABLET ORAL DAILY
COMMUNITY
Start: 2018-08-01

## 2023-10-01 RX ORDER — TRIAMTERENE/HYDROCHLOROTHIAZID 37.5-25 MG
1 TABLET ORAL DAILY
COMMUNITY

## 2023-10-01 RX ORDER — TRIAMCINOLONE ACETONIDE 40 MG/ML
INJECTION, SUSPENSION INTRA-ARTICULAR; INTRAMUSCULAR
COMMUNITY
Start: 2022-09-09

## 2023-10-01 RX ORDER — VALSARTAN AND HYDROCHLOROTHIAZIDE 160; 12.5 MG/1; MG/1
TABLET, FILM COATED ORAL
COMMUNITY
Start: 2016-10-07

## 2023-10-01 RX ORDER — BROMFENAC SODIUM 0.81 MG/ML
SOLUTION/ DROPS OPHTHALMIC
COMMUNITY

## 2023-10-01 RX ORDER — HYDROCORTISONE ACETATE PRAMOXINE HCL 2.5; 1 G/100G; G/100G
CREAM TOPICAL
COMMUNITY
Start: 2023-02-07

## 2023-10-01 RX ORDER — MUPIROCIN 20 MG/G
22 OINTMENT TOPICAL
COMMUNITY
Start: 2020-11-04

## 2023-10-01 RX ORDER — METOPROLOL TARTRATE 25 MG/1
25 TABLET, FILM COATED ORAL DAILY
COMMUNITY
Start: 2023-08-29

## 2023-10-01 RX ORDER — HYDROCORTISONE 25 MG/G
CREAM TOPICAL 3 TIMES DAILY PRN
COMMUNITY
Start: 2022-09-21

## 2023-10-01 RX ORDER — VALSARTAN 80 MG/1
80 TABLET ORAL DAILY
COMMUNITY
Start: 2007-10-02

## 2023-10-01 RX ORDER — CLOPIDOGREL BISULFATE 75 MG/1
1 TABLET ORAL DAILY
COMMUNITY
Start: 2017-10-15

## 2023-10-01 RX ORDER — FUROSEMIDE 40 MG/1
40 TABLET ORAL
COMMUNITY
Start: 2020-10-26

## 2023-10-01 RX ORDER — METOPROLOL SUCCINATE 25 MG/1
25 TABLET, EXTENDED RELEASE ORAL DAILY
COMMUNITY
Start: 2023-08-31

## 2023-10-01 RX ORDER — FERROUS SULFATE 325(65) MG
TABLET ORAL
COMMUNITY

## 2023-10-01 RX ORDER — DICLOFENAC SODIUM 10 MG/G
GEL TOPICAL 2 TIMES DAILY
COMMUNITY
Start: 2017-12-01

## 2023-10-01 RX ORDER — HYDROCORTISONE ACETATE PRAMOXINE HCL 1; 1 G/100G; G/100G
CREAM TOPICAL
COMMUNITY
Start: 2023-02-07

## 2023-10-01 RX ORDER — VALACYCLOVIR HYDROCHLORIDE 1 G/1
0.5 TABLET, FILM COATED ORAL DAILY
COMMUNITY
Start: 2022-12-06

## 2023-10-01 RX ORDER — TRIAMCINOLONE ACETONIDE 1 MG/G
CREAM TOPICAL 2 TIMES DAILY
COMMUNITY
Start: 2021-06-29

## 2023-10-01 RX ORDER — ALLOPURINOL 300 MG/1
1 TABLET ORAL DAILY
COMMUNITY
Start: 2017-12-14

## 2023-10-01 RX ORDER — HYDROCHLOROTHIAZIDE 25 MG/1
TABLET ORAL
COMMUNITY
Start: 2016-10-23

## 2023-10-03 ENCOUNTER — CLINICAL SUPPORT (OUTPATIENT)
Dept: CARDIOLOGY | Facility: CLINIC | Age: 88
End: 2023-10-03
Payer: MEDICARE

## 2023-10-03 ENCOUNTER — OFFICE VISIT (OUTPATIENT)
Dept: CARDIOLOGY | Facility: CLINIC | Age: 88
End: 2023-10-03
Payer: MEDICARE

## 2023-10-03 VITALS
SYSTOLIC BLOOD PRESSURE: 104 MMHG | HEIGHT: 67 IN | BODY MASS INDEX: 26.84 KG/M2 | TEMPERATURE: 98.4 F | DIASTOLIC BLOOD PRESSURE: 60 MMHG | HEART RATE: 66 BPM | WEIGHT: 171 LBS

## 2023-10-03 DIAGNOSIS — Z95.0 CARDIAC PACEMAKER IN SITU: ICD-10-CM

## 2023-10-03 DIAGNOSIS — I49.5 SINOATRIAL NODE DYSFUNCTION (MULTI): ICD-10-CM

## 2023-10-03 DIAGNOSIS — I48.11 LONGSTANDING PERSISTENT ATRIAL FIBRILLATION (MULTI): Primary | ICD-10-CM

## 2023-10-03 PROCEDURE — 3078F DIAST BP <80 MM HG: CPT | Performed by: INTERNAL MEDICINE

## 2023-10-03 PROCEDURE — 3074F SYST BP LT 130 MM HG: CPT | Performed by: INTERNAL MEDICINE

## 2023-10-03 PROCEDURE — 99213 OFFICE O/P EST LOW 20 MIN: CPT | Performed by: INTERNAL MEDICINE

## 2023-10-03 PROCEDURE — 93288 INTERROG EVL PM/LDLS PM IP: CPT | Performed by: INTERNAL MEDICINE

## 2023-10-03 PROCEDURE — 1159F MED LIST DOCD IN RCRD: CPT | Performed by: INTERNAL MEDICINE

## 2023-10-03 PROCEDURE — 1036F TOBACCO NON-USER: CPT | Performed by: INTERNAL MEDICINE

## 2023-10-03 ASSESSMENT — PATIENT HEALTH QUESTIONNAIRE - PHQ9
2. FEELING DOWN, DEPRESSED OR HOPELESS: NOT AT ALL
1. LITTLE INTEREST OR PLEASURE IN DOING THINGS: NOT AT ALL
SUM OF ALL RESPONSES TO PHQ9 QUESTIONS 1 AND 2: 0

## 2023-10-03 NOTE — PROGRESS NOTES
Subjective   Victorino Bejarano is a 89 y.o. white male who presents again for pacemaker follow-up.  He was just seen by me a month ago.  He has a history of hypertension, moderate aortic stenosis, and complete heart block.  He underwent Minor DDDR pacemaker placement by me in 2017, using a tiny pacemaker generator.  He settled in atrial fibrillation by early  and was anticoagulated, though this was subsequently discontinued due to his unsteadiness.  He has recently had an increase in his generalized fatigue with exertional dyspnea, and chronic lower extremity edema and even edema in both hands.    The patient was  many years ago.  He had a long-term girlfriend of nearly 3 decades who  in 2017.  His daughter Charlene  in her 60s of lung cancer.  He has a daughter named Sydney, who brought him to the hospital today, though the patient now resides in the skilled nursing section of a local nursing facility.    Current Outpatient Medications on File Prior to Visit   Medication Sig    busPIRone (Buspar) 5 mg tablet 1 tablet (5 mg) 3 times a day.    clopidogrel (Plavix) 75 mg tablet 1 tablet (75 mg) once daily.    diclofenac sodium (Voltaren) 1 % gel gel Place on the skin 2 times a day. For pain    hydrocortisone acetate 2.5 % cream with perineal applicator Apply topically 3 times a day as needed.    metoprolol succinate XL (Toprol-XL) 25 mg 24 hr tablet Take 1 tablet (25 mg) by mouth once daily.    tamsulosin (Flomax) 0.4 mg 24 hr capsule 1 capsule (0.4 mg) once daily.    triamterene-hydrochlorothiazid (Maxzide-25) 37.5-25 mg tablet 1 tablet once daily.    valACYclovir (Valtrex) 1 gram tablet Take 0.5 tablets (500 mg) by mouth once daily.    acyclovir (Zovirax) 5 % ointment Apply topically 4 times a day.    allopurinol (Zyloprim) 300 mg tablet 1 tablet (300 mg) once daily.    besifloxacin 0.6 % drops,suspension Administer 1 drop into both eyes 3 times a day.    betamethasone dipropionate  0.05 % cream Apply topically once daily.    bromfenac 0.07 % drops Administer into affected eye(s). As directed    clobetasol (Temovate) 0.05 % cream PPLY SPARINGLY TO AFFECTED AREA(S) TWICE DAILY    Colace 100 mg capsule Take 1 capsule (100 mg) by mouth 2 times a day.    ferrous sulfate 325 (65 Fe) MG tablet Iron 325 MG TABS Quantity: 0 Refills: 0 Ordered: 19-Oct-2022 DO Active    furosemide (Lasix) 40 mg tablet Take 1 tablet (40 mg) by mouth once daily.    hydroCHLOROthiazide (HYDRODiuril) 12.5 mg tablet Take 1 tablet (12.5 mg) by mouth once daily.    hydroCHLOROthiazide (HYDRODiuril) 25 mg tablet     hydrocortisone (Anusol-HC) 2.5 % rectal cream Insert into the rectum 3 times a day as needed.    hydrocortisone-pramoxine (Analpram-HC) 1-1 % rectal cream APPLY TWICE PER DAY TO EXTERNAL RECTUM FOR 10 DAYS THEN AS NEEDED    hydrocortisone-pramoxine (Analpram-HC) 2.5-1 % cream APPLY TWICE PER DAY TO EXTERNAL RECTUM FOR 10 DAYS THEN AS NEEDED    losartan-hydrochlorothiazide (Hyzaar) 50-12.5 mg tablet Take 1 tablet by mouth once daily.    metoprolol tartrate (Lopressor) 25 mg tablet Take 1 tablet (25 mg) by mouth once daily.    mupirocin (Bactroban) 2 % ointment Apply 220 Applications topically 3 times a day.    polyethylene glycol (Glycolax, Miralax) 17 gram/dose powder Take 1 Capful by mouth once daily. IN 8 OUNCES OF WATER, JUICE, OR TEA    sodium hyaluronate, cross-linked (Gel-One) 30 mg/3 mL injection Gel-One 30 MG/3ML Intra-articular Prefilled Syringe Bilateral Knees Quantity: 0 Refills: 0 Ordered: 21-Jul-2021 Hemant Piña MD Start : 21-Jul-2021 Complete    triamcinolone (Kenalog) 0.1 % cream Apply topically 2 times a day.    triamcinolone acetonide (Kenalog-40) 40 mg/mL injection 2 ml of Xylocaine and 1 ml 40 mg of kenalog injected intra-articularly into the affected shoulder    valACYclovir (Valtrex) 500 mg tablet 1 tablet (500 mg) once daily.    valsartan (Diovan) 80 mg tablet Take 1 tablet (80 mg) by mouth  once daily.    valsartan-hydrochlorothiazide (Diovan-HCT) 160-12.5 mg tablet      Objective   Physical Exam  Gen: Pleasant elderly gentleman in no distress  HEENT: Hard of hearing, in spite of bilateral hearing aids  Neck: No jugular venous distention noted  Lungs: Clear, with no wheezes or rales  Left subclavian pacemaker pocket: Normal in appearance.  Heart: Regular rhythm with grade 3/6 systolic ejection murmur and diminished S2  Abdomen: Benign, without organomegaly or ascites  Extremities: 2+ edema below knees bilaterally and 1+ edema in both hands  Neuro: No focal abnormalities    Pacemaker check:  A pacemaker check today showed normal device function, with excellent ventricular pacing thresholds.  The patient is reading a 48% atrial fibrillation burden but I believe this is truly a 100% burden with undersensing.  His lower rate was increased from 60 up to 65 bpm upper rate change from 120 up to 130 bpm to seek a better sensor-driven heart rate response to exercise.  The battery status is good, with an estimated longevity of 3 years or greater.    Impressions:  1.  Chronic hypertension, followed by Dr. Doc Luna at nursing home.  2.  High-grade AV block, with pacemaker-dependency.  3.  Status post Minor DDDR pacemaker in November 2017, with normal device function, apart from atrial undersensing of atrial fibrillation.  4.  Atrial fibrillation, likely longstanding persistent or permanent.  The patient is simply on Plavix, as full anticoagulation is felt to be dangerous for him.  5.  Mild anasarca with significant edema in both upper and lower extremities.  This may be referable to his aortic stenosis.  He is on low-dose thiazide diuretics but likely warrants a loop diuretic.  6.  Moderate aortic stenosis, perhaps becoming severe.  7.  Other medical problems, including peripheral arterial disease (status post left popliteal artery stenting), degenerative joint disease, chronic renal insufficiency, and  diminished hearing.    Recommendations:  1.  The patient's pacemaker was reprogrammed to more aggressive sensor settings as noted.  2.  I wrote a handwritten prescription for the patient to begin furosemide 20 mg daily at his nursing facility, to see if this helps clear his peripheral edema.  Dr. Luna will hopefully obtain laboratory studies to ensure that the patient does not become too prerenal from this medication.  3.  The patient will follow-up with me in 6 months for another pacemaker check.  4.  If the patient's decrease in functional capacity and worsening of edema are secondary to progressive aortic stenosis, his long-term prognosis is clearly guarded.  Given his very advanced age, he is likely not a good candidate even for TAVR.    Prem Bingham MD

## 2023-10-09 ENCOUNTER — PATIENT MESSAGE (OUTPATIENT)
Dept: PRIMARY CARE | Facility: CLINIC | Age: 88
End: 2023-10-09
Payer: MEDICARE

## 2023-10-19 ENCOUNTER — LAB REQUISITION (OUTPATIENT)
Dept: LAB | Facility: HOSPITAL | Age: 88
End: 2023-10-19
Payer: MEDICARE

## 2023-10-19 DIAGNOSIS — R79.89 OTHER SPECIFIED ABNORMAL FINDINGS OF BLOOD CHEMISTRY: ICD-10-CM

## 2023-10-19 LAB
ANION GAP SERPL CALC-SCNC: 12 MMOL/L (ref 10–20)
BUN SERPL-MCNC: 58 MG/DL (ref 6–23)
CALCIUM SERPL-MCNC: 10 MG/DL (ref 8.6–10.3)
CHLORIDE SERPL-SCNC: 105 MMOL/L (ref 98–107)
CO2 SERPL-SCNC: 27 MMOL/L (ref 21–32)
CREAT SERPL-MCNC: 2.42 MG/DL (ref 0.5–1.3)
GFR SERPL CREATININE-BSD FRML MDRD: 24 ML/MIN/1.73M*2
GLUCOSE SERPL-MCNC: 104 MG/DL (ref 74–99)
POTASSIUM SERPL-SCNC: 4.4 MMOL/L (ref 3.5–5.3)
SODIUM SERPL-SCNC: 140 MMOL/L (ref 136–145)

## 2023-10-19 PROCEDURE — 80048 BASIC METABOLIC PNL TOTAL CA: CPT

## 2023-10-30 ENCOUNTER — TELEPHONE (OUTPATIENT)
Dept: PRIMARY CARE | Facility: CLINIC | Age: 88
End: 2023-10-30
Payer: MEDICARE

## 2023-10-30 NOTE — TELEPHONE ENCOUNTER
Pt family is asking if they can you to their My Chart so they can communicate with you when they have questions?

## 2023-10-31 ENCOUNTER — NURSING HOME VISIT (OUTPATIENT)
Dept: POST ACUTE CARE | Facility: EXTERNAL LOCATION | Age: 88
End: 2023-10-31
Payer: MEDICARE

## 2023-10-31 VITALS
TEMPERATURE: 98.1 F | RESPIRATION RATE: 18 BRPM | BODY MASS INDEX: 26.78 KG/M2 | DIASTOLIC BLOOD PRESSURE: 68 MMHG | HEART RATE: 68 BPM | WEIGHT: 171 LBS | SYSTOLIC BLOOD PRESSURE: 121 MMHG | OXYGEN SATURATION: 97 %

## 2023-10-31 DIAGNOSIS — F02.A0 MILD LATE ONSET ALZHEIMER'S DEMENTIA WITHOUT BEHAVIORAL DISTURBANCE, PSYCHOTIC DISTURBANCE, MOOD DISTURBANCE, OR ANXIETY (MULTI): ICD-10-CM

## 2023-10-31 DIAGNOSIS — G30.1 MILD LATE ONSET ALZHEIMER'S DEMENTIA WITHOUT BEHAVIORAL DISTURBANCE, PSYCHOTIC DISTURBANCE, MOOD DISTURBANCE, OR ANXIETY (MULTI): ICD-10-CM

## 2023-10-31 DIAGNOSIS — R42 DIZZINESS: ICD-10-CM

## 2023-10-31 DIAGNOSIS — N18.31 STAGE 3A CHRONIC KIDNEY DISEASE (MULTI): ICD-10-CM

## 2023-10-31 DIAGNOSIS — I35.0 NONRHEUMATIC AORTIC VALVE STENOSIS: Primary | ICD-10-CM

## 2023-10-31 DIAGNOSIS — I48.91 ATRIAL FIBRILLATION, UNSPECIFIED TYPE (MULTI): ICD-10-CM

## 2023-10-31 DIAGNOSIS — R60.9 PERIPHERAL EDEMA: ICD-10-CM

## 2023-10-31 PROCEDURE — 99309 SBSQ NF CARE MODERATE MDM 30: CPT | Performed by: NURSE PRACTITIONER

## 2023-10-31 NOTE — PROGRESS NOTES
"  GASTROENTEROLOGY DAILY PROGRESS NOTE  [facility]     PATIENT NAME:  Tim Humphries          YOB: 1947  AGE:  75 y.o.   MRN: [id number]      SUBJECTIVE   Patient resting; spoke to wife regarding liver lesion.  Confirmed with MRI-even with loop recorder, can proceed with MRI imaging of the liver  Noted IR evaluation with retroperitoneal hematoma bleeding seen    REVIEW OF SYSTEMS   13 point ROS unchanged    VITAL SIGNS   Height: 1.854 m (6' 1\") Weight: 84.4 kg (186 lb) Body mass index is 24.54 kg/m².  Vitals over the last 24 hours:   Temp:  [36.3 °C (97.3 °F)-36.9 °C (98.5 °F)] 36.7 °C (98.1 °F)  Heart Rate:  [] 94  Resp:  [16-37] 25  BP: ()/(52-97) 168/89  PHYSICAL EXAM   Physical Exam  General appearance: alert, appears stated age and cooperative  Head: normocephalic  Lungs: clear to auscultation anteriorly   Heart: regular rate   Abdomen: soft, non-tender; bowel sounds normal; no masses, no organomegaly  Rectal:   Extremities: no edema  Skin: No jaundice  Neurologic: awake and alert      IMAGING AND LABS REVIEWED     Lab Results   Component Value Date    WBC 16.01 (H) 10/26/2022    HGB 11.8 (L) 10/26/2022    HCT 36.1 (L) 10/26/2022     (L) 10/26/2022     (H) 10/26/2022     (H) 10/26/2022     10/26/2022    K 4.6 10/26/2022     10/26/2022    CREATININE 2.2 (H) 10/26/2022    BUN 48 (H) 10/26/2022    CO2 22 10/26/2022    INR 1.4 10/26/2022    HGBA1C 5.9 (H) 10/25/2022     CT HEAD WITHOUT IV CONTRAST    Result Date: 10/25/2022  CLINICAL HISTORY: Mental status change, unknown cause   . COMPARISON: CT 10/24/2022. TECHNIQUE: Routine unenhanced CT scan was performed of the head using departmental protocol. CT DOSE:  One or more dose reduction techniques (e.g. automated exposure control, adjustment of the mA and/or kV according to patient size, use of iterative reconstruction technique) utilized for this examination. COMMENT: There is no evidence of acute " Subjective   Victorino Bejarano is a 89 y.o. male Here to follow up on intermittent dizziness and edema.   HPI  Here to follow up on edema, dizziness, decreased functional status.  He was evalauted by Dr Bingham, started on lasix and edema remains, per noted may be related to aortic stenosis.  Buspar was stopped  per psychiatry due to dizziness, he denies any at present. .    Victorino is sitting up in chair, denies any complaints when asked.   BMP checked with recent addition of lasix, acceptable.     Review of Systems   Constitutional:  Negative for chills, fatigue and fever.   Respiratory:  Negative for cough and shortness of breath.    Cardiovascular:  Negative for chest pain and palpitations.   Gastrointestinal:  Negative for abdominal pain, constipation, diarrhea, nausea and vomiting.   Genitourinary:  Negative for difficulty urinating.       Objective   /68   Pulse 68   Temp 36.7 °C (98.1 °F)   Resp 18   Wt 77.6 kg (171 lb)   SpO2 97%   BMI 26.78 kg/m²     Physical Exam  Constitutional:       General: He is not in acute distress.  HENT:      Head: Normocephalic and atraumatic.   Eyes:      Conjunctiva/sclera: Conjunctivae normal.   Cardiovascular:      Rate and Rhythm: Normal rate. Rhythm irregular.   Pulmonary:      Effort: Pulmonary effort is normal. No respiratory distress.      Breath sounds: Normal breath sounds.   Abdominal:      General: Bowel sounds are normal. There is no distension.      Palpations: Abdomen is soft.      Tenderness: There is no abdominal tenderness.   Musculoskeletal:         General: Normal range of motion.      Right lower leg: Edema (2+) present.      Left lower leg: Edema (2+) present.   Skin:     General: Skin is warm and dry.   Neurological:      General: No focal deficit present.      Mental Status: He is alert.      Comments: Slightly forgetful, poor historian.     Psychiatric:         Mood and Affect: Mood normal.         Behavior: Behavior normal.          Assessment/Plan   Problem List Items Addressed This Visit       Nonrheumatic aortic valve stenosis - Primary     Denies any sob, lightheadedness at present, he was recently evaluated by Dr Bingham, started on lasix.           Mild late onset Alzheimer's dementia without behavioral disturbance, psychotic disturbance, mood disturbance, or anxiety (CMS/HCC)     Mild.          Stage 3a chronic kidney disease (CMS/[facility])     monitor with routine labs.  BMP recently checked with addition of lasix, acceptable.            Dizziness     Buspar stopped per psychiatry team to see if this changes sx.   He deneis any dizzines at present          Peripheral edema    Atrial fibrillation (CMS/[facility])     Rate controlled.           labs/meds/orders reviewed  staff to monitor and notify for any changes.   Next labs 2/24 and prn  Call placed to daughter Sydney to update, left message.   ON 11/1 also called Sydney, updated on treatment plan, all questions answered.        intracranial hemorrhage or mass effect. There is no evidence of midline shift. The cisterns and sulci are clear. The gray-white differentiation is unremarkable. There is mild periventricular, subcortical and deep white matter hypoattenuation in keeping with chronic small vessel ischemic changes. There is mild cortical atrophy. There is no large territorial infarct. However, MRI is more sensitive for the evaluation of acute ischemia.  The deep gray nuclei, brainstem and cerebellum are otherwise unremarkable. The ventricles and extra-axial spaces demonstrate no acute abnormalities. Partial opacification of the right maxillary sinus is noted with an air-fluid level. There are multiple right maxillary sinus fractures noted. Multiple right zygomatic arch fractures are also noted. Mild partial opacification of the ethmoid sinuses noted. The visualized mastoid air cells are clear. The orbits and visualized parapharyngeal soft tissues are unremarkable. The bony calvarium is otherwise intact.     IMPRESSION: 1.  No acute intracranial hemorrhage, large territorial infarct, mass effect or midline shift is identified. 2.  Multiple right maxillary sinus and zygomatic arch fractures with an air-fluid level within the right maxillary sinus.     ULTRASOUND GUIDED VASCULAR ACCESS    Result Date: 10/25/2022  CLINICAL HISTORY:    Submassive pulmonary embolus with right heart strain.     IMPRESSION:   Successful bilateral pulmonary arteriography and catheter-directed thrombolysis. COMMENT: PROCEDURE:  1. Ultrasound guided access of the central right common femoral vein. 2. Subselective right lower lobe pulmonary arteriography. 3. Subselective catheter directed thrombolysis of the right pulmonary arterial thrombus extending into the right lower lobe branch. 4. Subselective left lower lobe pulmonary arteriography. 5. Subselective catheter directed thrombolysis of the left pulmonary arterial thrombus extending into the left lower lobe  branch. RADIOLOGIST: Sarkis Gerardo MD ANESTHESIA:  Local 1% lidocaine. FLUORO TIME:  7.7 minutes REFERENCE AIR KERMA: 44 mGy CONTRAST:  20 cc of Omnipaque 300 DESCRIPTION OF PROCEDURE:    Written informed consent was obtained following explanation of risks and benefits of the procedure. Specifically, risks of cardiac arrhythmia, pulmonary arterial injury, and intracranial hemorrhage were discussed with the patient. Additionally, significantly elevated risk of abdominal hemorrhage related to known adrenal hemorrhage was clearly discussed. The patient was placed supine on the IR procedure table. Maximal sterile barrier precautions were utilized during catheter insertion including aseptic technique, the use of cap, mask, sterile gown, sterile gloves, sterile drapes, hand hygiene, and patient cutaneous antisepsis with chlorhexidene 2%. Grayscale and color Doppler ultrasound evaluation of the right common femoral vein was performed. The right common femoral vein was patent and compressible. The right groin was prepped and draped in usual sterile fashion. Local anesthesia was administered with 1% lidocaine. Under ultrasound guidance, the right common femoral vein was accessed with a 21-gauge needle. An 018 wire was advanced centrally. Sequential exchange was made for a 5 Ethiopian a microcatheter sheath, 035 Ness wire, and 5 Ethiopian vascular sheath. The sheath was secured with 0-0 silk. Utilizing an H1 catheter with Glidewire, the right main pulmonary artery was selected with successful subselection of a right lower lobe pulmonary arterial branch. Subselective right lower lobe pulmonary arteriography was performed, confirming satisfactory position within the right lower lobe branch distal to the main thrombus. Over a J Phelan wire, exchange was made for the Darrion-Macnimesh flush catheter. 10 mg of TPA was then successfully infused into the right main pulmonary artery, extending into the right lower lobe pulmonary arterial  branch. Utilizing an H1 catheter with Glidewire, the left main pulmonary artery was selected with successful subselection of a left lower lobe pulmonary arterial branch. Subselective left lower lobe pulmonary arteriography was performed, confirming satisfactory position within the left lower lobe branch distal to the main thrombus. Over a J Phelan wire, exchange was made for the Darrion-Macnamara flush catheter. 10 mg of TPA was then successfully infused into the left main pulmonary artery, extending into the left lower lobe pulmonary arterial branch. Catheter and sheath were removed. Hemostasis of the right common femoral vein was achieved with manual compression.     IR THROMBOLYSIS    Result Date: 10/25/2022  CLINICAL HISTORY:    Submassive pulmonary embolus with right heart strain.     IMPRESSION:   Successful bilateral pulmonary arteriography and catheter-directed thrombolysis. COMMENT: PROCEDURE:  1. Ultrasound guided access of the central right common femoral vein. 2. Subselective right lower lobe pulmonary arteriography. 3. Subselective catheter directed thrombolysis of the right pulmonary arterial thrombus extending into the right lower lobe branch. 4. Subselective left lower lobe pulmonary arteriography. 5. Subselective catheter directed thrombolysis of the left pulmonary arterial thrombus extending into the left lower lobe branch. RADIOLOGIST: Sarkis Gerardo MD ANESTHESIA:  Local 1% lidocaine. FLUORO TIME:  7.7 minutes REFERENCE AIR KERMA: 44 mGy CONTRAST:  20 cc of Omnipaque 300 DESCRIPTION OF PROCEDURE:    Written informed consent was obtained following explanation of risks and benefits of the procedure. Specifically, risks of cardiac arrhythmia, pulmonary arterial injury, and intracranial hemorrhage were discussed with the patient. Additionally, significantly elevated risk of abdominal hemorrhage related to known adrenal hemorrhage was clearly discussed. The patient was placed supine on the IR procedure  table. Maximal sterile barrier precautions were utilized during catheter insertion including aseptic technique, the use of cap, mask, sterile gown, sterile gloves, sterile drapes, hand hygiene, and patient cutaneous antisepsis with chlorhexidene 2%. Grayscale and color Doppler ultrasound evaluation of the right common femoral vein was performed. The right common femoral vein was patent and compressible. The right groin was prepped and draped in usual sterile fashion. Local anesthesia was administered with 1% lidocaine. Under ultrasound guidance, the right common femoral vein was accessed with a 21-gauge needle. An 018 wire was advanced centrally. Sequential exchange was made for a 5 Norwegian a microcatheter sheath, 035 Ness wire, and 5 Norwegian vascular sheath. The sheath was secured with 0-0 silk. Utilizing an H1 catheter with Glidewire, the right main pulmonary artery was selected with successful subselection of a right lower lobe pulmonary arterial branch. Subselective right lower lobe pulmonary arteriography was performed, confirming satisfactory position within the right lower lobe branch distal to the main thrombus. Over a J Phelan wire, exchange was made for the Darrion-Macnamara flush catheter. 10 mg of TPA was then successfully infused into the right main pulmonary artery, extending into the right lower lobe pulmonary arterial branch. Utilizing an H1 catheter with Glidewire, the left main pulmonary artery was selected with successful subselection of a left lower lobe pulmonary arterial branch. Subselective left lower lobe pulmonary arteriography was performed, confirming satisfactory position within the left lower lobe branch distal to the main thrombus. Over a J Phelan wire, exchange was made for the Dariron-Macnamara flush catheter. 10 mg of TPA was then successfully infused into the left main pulmonary artery, extending into the left lower lobe pulmonary arterial branch. Catheter and sheath were removed.  Hemostasis of the right common femoral vein was achieved with manual compression.     X-RAY CHEST 1 VIEW    Result Date: 10/26/2022  CLINICAL HISTORY: Shortness of breath; pulmonary emboli     IMPRESSION: No acute cardiopulmonary abnormality seen.. COMMENT:    A single AP view of the chest was performed. Comparison: AP chest x-ray from 10/24/2022. The heart size and pulmonary vasculature remain within normal limits. The lung fields appear clear, and no pleural effusions are seen. No hilar abnormality is identified. There is mild tortuosity of the descending thoracic aorta. An electronic device projects over the left lung base projecting over the left anterior left fourth rib, likely a loop recorder. When compared to the prior study, there has been no significant interval change.     Ultrasound venous leg bilateral    Result Date: 10/25/2022  CLINICAL HISTORY: I26.02: Saddle embolus of pulmonary artery with acute cor pulmonale PROCEDURE: Ultrasound examination of both lower extremity deep venous systems was performed using grayscale, color and spectral Doppler. COMPARISON: None. COMMENT: Right: The right common femoral, saphenofemoral junction, femoral, and popliteal veins demonstrate a normal response to compression maneuvers. There is also a normal response to respiratory variation. The bifurcation of the common femoral vein into the superficial and deep femoral veins is visualized.  Flow is identified in these vessels with no evidence of intraluminal thrombus on either color Doppler or grayscale images. Nearly occlusive thrombus within the posterior tibial vein. Left: The mid femoral vein contains partially occlusive thrombus.  The distal popliteal vein contains occlusive thrombus.  The common femoral vein is patent and compressible.  The peroneal vein and posterior tibial veins contain partially to nearly occlusive thrombus.     IMPRESSION: Examination positive for deep vein thrombosis bilaterally as described,  with greater than right.     CT CHEST ABDOMEN PELVIS WITHOUT IV CONTRAST    Result Date: 10/25/2022  CLINICAL HISTORY: has latanya. PE, adrenal hemorrhage and ruling out retroperitoneal hemorrhage   . Comparison: CT 10/24/2022 COMMENT: A CT examination of the chest, abdomen and pelvis was performed without intravenous contrast. CT DOSE:  One or more dose reduction techniques (e.g. automated exposure control, adjustment of the mA and/or kV according to patient size, use of iterative reconstruction technique) utilized for this examination. Intravenous contrast: Not administered, limiting evaluation of the solid organs and vasculature. Oral contrast: Not administered, limiting evaluation of the bowel. Mild bilateral lower lobe subsegmental atelectatic changes noted. A previously suggested 1 cm right perihilar groundglass opacity nodule is unchanged. No large lung masses noted. No pneumothorax. Central airways patent. Known bilateral pulmonary emboli are not well-visualized without contrast. Heart normal in size. Small right basilar pleural effusion noted. No pericardial effusion. 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 Vascular calcifications noted. No mediastinal or gross bulky hilar lymphadenopathy noted. No axillary lymphadenopathy. Hepatic cysts again visualized. Additional ill-defined hepatic lesions noted on the prior CT are suboptimally demonstrated without contrast. High density material within the gallbladder is in keeping with vicarious excretion likely related to recent CT study. No biliary ductal dilatation. Unenhanced pancreas, spleen and left adrenal gland grossly on remarkable. Left kidney grossly unremarkable. There is a large acute right-sided retroperitoneal hemorrhage with an ill-defined appearance now demonstrated involving the right adrenal gland and right kidney. The retroperitoneal hemorrhage measures up to approximately 14.3 x 15.0 cm in the axial plane and extends for a  craniocaudal dimension of approximately 26.5 cm. No hydronephrosis or ureteral dilatation. Urinary bladder decompressed with Sosa catheter. Prostate gland normal in size. A small to moderate amount of pelvic ascites is noted which small high density layering components which may reflect a component of hemoperitoneum. Vascular calcifications noted. No abdominal aortic aneurysm. No abdominal lymphadenopathy. No bowel obstruction or pneumoperitoneum. Colonic diverticulosis noted. No gross focal inflammatory changes of bowel noted. No pelvic lymphadenopathy. Bones appear unchanged from the prior study with redemonstration of degenerative changes of the spine and hips.     IMPRESSION: 1.  Findings concerning for a large right-sided retroperitoneal hemorrhage including the right kidney and right adrenal gland. A small to moderate amount of pelvic ascites is also noted which appears mildly hyperdense and may reflect a component of hemoperitoneum. 2.  Additional chronic and incidental findings as described above. Finding:    Unexpected significant hemorrhage (chest, abdomen, pelvis)   Acuity: Critical  Status:  CLOSED Critical read back was performed and results were read back by Dr. Simms, on 10/25/2022 8:10 PM        ASSESSMENT/PLAN   Impressions/plan   1. Liver lesion 7 cm-indeterminate, possible malignancy, possible hemangioma possible hemorrhage; cleared by MRI to proceed with MRI even with a loop recorder-we will proceed with and without contrast. -   2.  Liver function test unchanged  3.  Bilateral PEs now status post IVC filter    Service: Gastroenterology    AUTHOR:  Kasandra Moe DO  10/26/2022

## 2023-10-31 NOTE — LETTER
Patient: Victorino Bejarano  : 10/25/1924    Encounter Date: 10/31/2023    Subjective  Victorino Bejarano is a 89 y.o. male Here to follow up on intermittent dizziness and edema.   HPI  Here to follow up on edema, dizziness, decreased functional status.  He was evalauted by Dr Bingham, started on lasix and edema remains, per noted may be related to aortic stenosis.  Buspar was stopped  per psychiatry due to dizziness, he denies any at present. .    Victorino is sitting up in chair, denies any complaints when asked.   BMP checked with recent addition of lasix, acceptable.     Review of Systems   Constitutional:  Negative for chills, fatigue and fever.   Respiratory:  Negative for cough and shortness of breath.    Cardiovascular:  Negative for chest pain and palpitations.   Gastrointestinal:  Negative for abdominal pain, constipation, diarrhea, nausea and vomiting.   Genitourinary:  Negative for difficulty urinating.       Objective  /68   Pulse 68   Temp 36.7 °C (98.1 °F)   Resp 18   Wt 77.6 kg (171 lb)   SpO2 97%   BMI 26.78 kg/m²     Physical Exam  Constitutional:       General: He is not in acute distress.  HENT:      Head: Normocephalic and atraumatic.   Eyes:      Conjunctiva/sclera: Conjunctivae normal.   Cardiovascular:      Rate and Rhythm: Normal rate. Rhythm irregular.   Pulmonary:      Effort: Pulmonary effort is normal. No respiratory distress.      Breath sounds: Normal breath sounds.   Abdominal:      General: Bowel sounds are normal. There is no distension.      Palpations: Abdomen is soft.      Tenderness: There is no abdominal tenderness.   Musculoskeletal:         General: Normal range of motion.      Right lower leg: Edema (2+) present.      Left lower leg: Edema (2+) present.   Skin:     General: Skin is warm and dry.   Neurological:      General: No focal deficit present.      Mental Status: He is alert.      Comments: Slightly forgetful, poor historian.     Psychiatric:         Mood  and Affect: Mood normal.         Behavior: Behavior normal.         Assessment/Plan  Problem List Items Addressed This Visit       Nonrheumatic aortic valve stenosis - Primary     Denies any sob, lightheadedness at present, he was recently evaluated by Dr Bingham, started on lasix.           Mild late onset Alzheimer's dementia without behavioral disturbance, psychotic disturbance, mood disturbance, or anxiety (CMS/HCC)     Mild.          Stage 3a chronic kidney disease (CMS/HCC)     monitor with routine labs.  BMP recently checked with addition of lasix, acceptable.            Dizziness     Buspar stopped per psychiatry team to see if this changes sx.   He deneis any dizzines at present          Peripheral edema    Atrial fibrillation (CMS/HCC)     Rate controlled.           labs/meds/orders reviewed  staff to monitor and notify for any changes.   Next labs 2/24 and prn  Call placed to daughter Sydney to update, left message.   ON 11/1 also called Sydney, updated on treatment plan, all questions answered.         Electronically Signed By: JONNY Camarillo-CNP   11/3/23  2:23 PM

## 2023-11-03 ASSESSMENT — ENCOUNTER SYMPTOMS
FATIGUE: 0
DIFFICULTY URINATING: 0
PALPITATIONS: 0
FEVER: 0
NAUSEA: 0
SHORTNESS OF BREATH: 0
COUGH: 0
DIARRHEA: 0
CONSTIPATION: 0
VOMITING: 0
ABDOMINAL PAIN: 0
CHILLS: 0

## 2023-11-03 NOTE — ASSESSMENT & PLAN NOTE
Denies any sob, lightheadedness at present, he was recently evaluated by Dr Bingham, started on lasix.

## 2023-11-03 NOTE — ASSESSMENT & PLAN NOTE
Buspar stopped per psychiatry team to see if this changes sx.   He deneis any dizzines at present

## 2023-11-16 ENCOUNTER — NURSING HOME VISIT (OUTPATIENT)
Dept: POST ACUTE CARE | Facility: EXTERNAL LOCATION | Age: 88
End: 2023-11-16
Payer: MEDICARE

## 2023-11-16 VITALS
SYSTOLIC BLOOD PRESSURE: 114 MMHG | TEMPERATURE: 97.5 F | WEIGHT: 166 LBS | OXYGEN SATURATION: 96 % | RESPIRATION RATE: 18 BRPM | BODY MASS INDEX: 26 KG/M2 | HEART RATE: 70 BPM | DIASTOLIC BLOOD PRESSURE: 62 MMHG

## 2023-11-16 DIAGNOSIS — F02.A0 MILD LATE ONSET ALZHEIMER'S DEMENTIA WITHOUT BEHAVIORAL DISTURBANCE, PSYCHOTIC DISTURBANCE, MOOD DISTURBANCE, OR ANXIETY (MULTI): ICD-10-CM

## 2023-11-16 DIAGNOSIS — I35.0 NONRHEUMATIC AORTIC VALVE STENOSIS: ICD-10-CM

## 2023-11-16 DIAGNOSIS — G30.1 MILD LATE ONSET ALZHEIMER'S DEMENTIA WITHOUT BEHAVIORAL DISTURBANCE, PSYCHOTIC DISTURBANCE, MOOD DISTURBANCE, OR ANXIETY (MULTI): ICD-10-CM

## 2023-11-16 DIAGNOSIS — R42 DIZZINESS: ICD-10-CM

## 2023-11-16 DIAGNOSIS — E21.0 PRIMARY HYPERPARATHYROIDISM (MULTI): ICD-10-CM

## 2023-11-16 DIAGNOSIS — I48.91 ATRIAL FIBRILLATION, UNSPECIFIED TYPE (MULTI): Primary | ICD-10-CM

## 2023-11-16 PROCEDURE — 99308 SBSQ NF CARE LOW MDM 20: CPT | Performed by: INTERNAL MEDICINE

## 2023-11-16 NOTE — PROGRESS NOTES
Subjective   Patient ID: Victorino Bejarano is a 89 y.o. male who is long term resident being seen and evaluated for multiple medical problems.    HPI   89-year-old male patient sitting up in the common area in his chair in no distress.  He still ambulates with a walker.  He denies any symptoms of lightheadedness or dizziness for me at this time.  Nursing reports has been doing stable and has no new adverse events reported to me.    Current high risk medication:  Flomax  Plavix    Laboratory examination from October 2023:  Potassium 4.4  Bicarbonate 27  Creatinine 2.42  Laboratory examination from May 2023:  TSH 0.47  Hemoglobin 10    Review of Systems   Constitutional:  Negative for chills, diaphoresis and fever.   Respiratory:  Negative for cough and shortness of breath.    Cardiovascular:  Negative for chest pain and leg swelling.   Gastrointestinal:  Negative for constipation, diarrhea, nausea and vomiting.   Musculoskeletal:  Negative for joint swelling and myalgias.       Objective   /62   Pulse 70   Temp 36.4 °C (97.5 °F)   Resp 18   Wt 75.3 kg (166 lb)   SpO2 96%   BMI 26.00 kg/m²     Physical Exam  Constitutional:       General: He is not in acute distress.  HENT:      Head: Normocephalic and atraumatic.   Eyes:      Conjunctiva/sclera: Conjunctivae normal.   Cardiovascular:      Rate and Rhythm: Normal rate. Rhythm irregular.      Heart sounds: Murmur (There is a barely audible systolic ejection murmur at the right upper sternal border) heard.   Pulmonary:      Effort: Pulmonary effort is normal. No respiratory distress.      Breath sounds: Normal breath sounds.   Abdominal:      General: Bowel sounds are normal. There is no distension.      Palpations: Abdomen is soft.      Tenderness: There is no abdominal tenderness.   Musculoskeletal:         General: Normal range of motion.      Right lower leg: No edema.      Left lower leg: No edema.   Skin:     General: Skin is warm and dry.    Neurological:      General: No focal deficit present.      Mental Status: He is alert.      Comments: Slightly forgetful, poor historian.     Psychiatric:         Mood and Affect: Mood normal.         Behavior: Behavior normal.         Assessment/Plan   Problem List Items Addressed This Visit             ICD-10-CM    Nonrheumatic aortic valve stenosis I35.0    Mild late onset Alzheimer's dementia without behavioral disturbance, psychotic disturbance, mood disturbance, or anxiety (CMS/[facility]) G30.1, F02.A0    Primary hyperparathyroidism (CMS/[facility]) E21.0    Dizziness R42    Atrial fibrillation (CMS/[facility]) - Primary I48.91       A.  We will continue with rehabilitative restorative and supportive care as the patient tolerates    B.  Laboratory examinations will next be due in April 2023 or as needed    C.  We will continue to monitor the patient's complaints of dizziness however it is possible as per cardiology reported impression that it is a aortic stenosis is playing a part in this symptom    D.  The patient's prognosis is [id number]

## 2023-11-16 NOTE — LETTER
Patient: Victorino Bejarano  : 10/25/1924    Encounter Date: 2023    Subjective  Patient ID: Victorino Bejarano is a 89 y.o. male who is long term resident being seen and evaluated for multiple medical problems.    HPI   89-year-old male patient sitting up in the common area in his chair in no distress.  He still ambulates with a walker.  He denies any symptoms of lightheadedness or dizziness for me at this time.  Nursing reports has been doing stable and has no new adverse events reported to me.    Current high risk medication:  Flomax  Plavix    Laboratory examination from 2023:  Potassium 4.4  Bicarbonate 27  Creatinine 2.42  Laboratory examination from May 2023:  TSH 0.47  Hemoglobin 10    Review of Systems   Constitutional:  Negative for chills, diaphoresis and fever.   Respiratory:  Negative for cough and shortness of breath.    Cardiovascular:  Negative for chest pain and leg swelling.   Gastrointestinal:  Negative for constipation, diarrhea, nausea and vomiting.   Musculoskeletal:  Negative for joint swelling and myalgias.       Objective  /62   Pulse 70   Temp 36.4 °C (97.5 °F)   Resp 18   Wt 75.3 kg (166 lb)   SpO2 96%   BMI 26.00 kg/m²     Physical Exam  Constitutional:       General: He is not in acute distress.  HENT:      Head: Normocephalic and atraumatic.   Eyes:      Conjunctiva/sclera: Conjunctivae normal.   Cardiovascular:      Rate and Rhythm: Normal rate. Rhythm irregular.      Heart sounds: Murmur (There is a barely audible systolic ejection murmur at the right upper sternal border) heard.   Pulmonary:      Effort: Pulmonary effort is normal. No respiratory distress.      Breath sounds: Normal breath sounds.   Abdominal:      General: Bowel sounds are normal. There is no distension.      Palpations: Abdomen is soft.      Tenderness: There is no abdominal tenderness.   Musculoskeletal:         General: Normal range of motion.      Right lower leg: No edema.      Left  lower leg: No edema.   Skin:     General: Skin is warm and dry.   Neurological:      General: No focal deficit present.      Mental Status: He is alert.      Comments: Slightly forgetful, poor historian.     Psychiatric:         Mood and Affect: Mood normal.         Behavior: Behavior normal.         Assessment/Plan  Problem List Items Addressed This Visit             ICD-10-CM    Nonrheumatic aortic valve stenosis I35.0    Mild late onset Alzheimer's dementia without behavioral disturbance, psychotic disturbance, mood disturbance, or anxiety (CMS/[facility]) G30.1, F02.A0    Primary hyperparathyroidism (CMS/[facility]) E21.0    Dizziness R42    Atrial fibrillation (CMS/[facility]) - Primary I48.91       A.  We will continue with rehabilitative restorative and supportive care as the patient tolerates    B.  Laboratory examinations will next be due in April 2023 or as needed    C.  We will continue to monitor the patient's complaints of dizziness however it is possible as per cardiology reported impression that it is a aortic stenosis is playing a part in this symptom    D.  The patient's prognosis is [id number]      Electronically Signed By: Saurabh Luna MD   11/20/23  4:08 PM

## 2023-11-20 ASSESSMENT — ENCOUNTER SYMPTOMS
COUGH: 0
MYALGIAS: 0
JOINT SWELLING: 0
DIAPHORESIS: 0
SHORTNESS OF BREATH: 0
CONSTIPATION: 0
FEVER: 0
CHILLS: 0
DIARRHEA: 0
VOMITING: 0
NAUSEA: 0

## 2023-11-29 ENCOUNTER — NURSING HOME VISIT (OUTPATIENT)
Dept: POST ACUTE CARE | Facility: EXTERNAL LOCATION | Age: 88
End: 2023-11-29
Payer: MEDICARE

## 2023-11-29 DIAGNOSIS — N28.9 RENAL INSUFFICIENCY: ICD-10-CM

## 2023-11-29 DIAGNOSIS — I35.0 NONRHEUMATIC AORTIC VALVE STENOSIS: Primary | ICD-10-CM

## 2023-11-29 DIAGNOSIS — G30.1 MILD LATE ONSET ALZHEIMER'S DEMENTIA WITHOUT BEHAVIORAL DISTURBANCE, PSYCHOTIC DISTURBANCE, MOOD DISTURBANCE, OR ANXIETY (MULTI): ICD-10-CM

## 2023-11-29 DIAGNOSIS — F02.A0 MILD LATE ONSET ALZHEIMER'S DEMENTIA WITHOUT BEHAVIORAL DISTURBANCE, PSYCHOTIC DISTURBANCE, MOOD DISTURBANCE, OR ANXIETY (MULTI): ICD-10-CM

## 2023-11-29 DIAGNOSIS — R60.0 LEG EDEMA: ICD-10-CM

## 2023-11-29 DIAGNOSIS — I11.9 HYPERTENSIVE HEART DISEASE WITHOUT HEART FAILURE: ICD-10-CM

## 2023-11-29 DIAGNOSIS — I10 BENIGN ESSENTIAL HTN: Primary | ICD-10-CM

## 2023-11-29 DIAGNOSIS — I35.0 NONRHEUMATIC AORTIC VALVE STENOSIS: ICD-10-CM

## 2023-11-29 DIAGNOSIS — I50.9 CHRONIC CONGESTIVE HEART FAILURE, UNSPECIFIED HEART FAILURE TYPE (MULTI): ICD-10-CM

## 2023-11-29 PROCEDURE — 99310 SBSQ NF CARE HIGH MDM 45: CPT | Performed by: NURSE PRACTITIONER

## 2023-11-29 NOTE — LETTER
Patient: Victorino Bejarano  : 10/25/1924    Encounter Date: 2023    Subjective  Victorino Bejarano is a 89 y.o. male Here to follow up on intermittent dizziness and labs.   HPI  He was started on lasix last month, no significant difference in edema.  He remains intermittently dizzy.  Labs show worsening kidney function, bun up to 76 and creatinine 2.7.  Previous levels 2 weeks after starting lasix was 58 and 2.4 respectively which were slightly increased from baseline.   Pasha is sitting up in chair, states he is feeling ok now, requesting ice cream and apple pie.  Denies any dizziness, palpitations or sob when asked.     Review of Systems   Constitutional:  Negative for chills, fatigue and fever.   Respiratory:  Negative for cough and shortness of breath.    Cardiovascular:  Negative for chest pain and palpitations.   Gastrointestinal:  Negative for abdominal pain, constipation, diarrhea, nausea and vomiting.   Genitourinary:  Negative for difficulty urinating.       Objective  /64   Pulse 66   Temp 36.6 °C (97.9 °F)   Resp 20   Wt 76.7 kg (169 lb)   SpO2 94%   BMI 26.47 kg/m²     Physical Exam  Constitutional:       General: He is not in acute distress.  HENT:      Head: Normocephalic and atraumatic.   Eyes:      Conjunctiva/sclera: Conjunctivae normal.   Cardiovascular:      Rate and Rhythm: Normal rate. Rhythm irregular.   Pulmonary:      Effort: Pulmonary effort is normal. No respiratory distress.      Breath sounds: Normal breath sounds.   Abdominal:      General: Bowel sounds are normal. There is no distension.      Palpations: Abdomen is soft.      Tenderness: There is no abdominal tenderness.   Musculoskeletal:         General: Normal range of motion.      Right lower leg: Edema (2+) present.      Left lower leg: Edema (2+) present.   Skin:     General: Skin is warm and dry.   Neurological:      General: No focal deficit present.      Mental Status: He is alert.      Comments: Slightly  forgetful, poor historian.     Psychiatric:         Mood and Affect: Mood normal.         Behavior: Behavior normal.         Assessment/Plan  Problem List Items Addressed This Visit       Nonrheumatic aortic valve stenosis     Denies any sob, lightheadedness at present but has had intermittent dizziness.  He was started on lasix which is being stopped today due to kidney function.  There is concern for worsening aortic stenosis and echo is ordered to evaluate valve.           Benign essential HTN - Primary     Started on metoprolol through cardiology due to PVC's and pacer settings were recently adjusted.  BP acceptable.  Continue to monitor and adjust meds based on clinical course.           Renal insufficiency     BUN and creatinine signifncantly increased from baseline and today's labs show BUN 76 and creatinine 2.7.    Case discussed with Dr Luna, lasix will be stopped and repeat BMP ordered for 1 week.           Mild late onset Alzheimer's dementia without behavioral disturbance, psychotic disturbance, mood disturbance, or anxiety (CMS/HCC)     Mild but has slowly been declining.  He has also had some anxiety and is being followed by psychiatry.          Leg edema     He has bialteral leg edema, using compression stocking, minimal response to lasix which is being discontinued today due to kidney function.          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  Lasix was stopped due to worsening kidney function.             labs/meds/orders reviewed  staff to monitor and notify for any changes.   Next full abs 2/24 and prn  Case discussed with Dr Luna, will dc lasix and repeat BMP in 1 week to evaluate.  Will also order echo, last known echo 2017 and concern for worsening aortic stenosis, he would not be a good candidate for TAVR.    Spoke with daughter Sydney over phone and discussed need to stop lasix and also echo being ordered to  evaluate.  Time for coordination of care was greater than 35 minutes [facility] chart review, visit and exam, discussion with nursing, Dr. Luna,  and daughter israel over phone.  Await repeat BMP next week and echo results.             Electronically Signed By: NIELS Camarillo   12/1/23  2:58 PM

## 2023-11-29 NOTE — PROGRESS NOTES
Subjective   Victorino Bejarano is a 89 y.o. male Here to follow up on intermittent dizziness and labs.   HPI  He was started on lasix last month, no significant difference in edema.  He remains intermittently dizzy.  Labs show worsening kidney function, bun up to 76 and creatinine 2.7.  Previous levels 2 weeks after starting lasix was 58 and 2.4 respectively which were slightly increased from baseline.   Pasha is sitting up in chair, states he is feeling ok now, requesting ice cream and apple pie.  Denies any dizziness, palpitations or sob when asked.     Review of Systems   Constitutional:  Negative for chills, fatigue and fever.   Respiratory:  Negative for cough and shortness of breath.    Cardiovascular:  Negative for chest pain and palpitations.   Gastrointestinal:  Negative for abdominal pain, constipation, diarrhea, nausea and vomiting.   Genitourinary:  Negative for difficulty urinating.       Objective   /64   Pulse 66   Temp 36.6 °C (97.9 °F)   Resp 20   Wt 76.7 kg (169 lb)   SpO2 94%   BMI 26.47 kg/m²     Physical Exam  Constitutional:       General: He is not in acute distress.  HENT:      Head: Normocephalic and atraumatic.   Eyes:      Conjunctiva/sclera: Conjunctivae normal.   Cardiovascular:      Rate and Rhythm: Normal rate. Rhythm irregular.   Pulmonary:      Effort: Pulmonary effort is normal. No respiratory distress.      Breath sounds: Normal breath sounds.   Abdominal:      General: Bowel sounds are normal. There is no distension.      Palpations: Abdomen is soft.      Tenderness: There is no abdominal tenderness.   Musculoskeletal:         General: Normal range of motion.      Right lower leg: Edema (2+) present.      Left lower leg: Edema (2+) present.   Skin:     General: Skin is warm and dry.   Neurological:      General: No focal deficit present.      Mental Status: He is alert.      Comments: Slightly forgetful, poor historian.     Psychiatric:         Mood and Affect: Mood  normal.         Behavior: Behavior normal.         Assessment/Plan   Problem List Items Addressed This Visit       Nonrheumatic aortic valve stenosis     Denies any sob, lightheadedness at present but has had intermittent dizziness.  He was started on lasix which is being stopped today due to kidney function.  There is concern for worsening aortic stenosis and echo is ordered to evaluate valve.           Benign essential HTN - Primary     Started on metoprolol through cardiology due to PVC's and pacer settings were recently adjusted.  BP acceptable.  Continue to monitor and adjust meds based on clinical course.           Renal insufficiency     BUN and creatinine signifncantly increased from baseline and today's labs show BUN 76 and creatinine 2.7.    Case discussed with Dr Luna, lasix will be stopped and repeat BMP ordered for 1 week.           Mild late onset Alzheimer's dementia without behavioral disturbance, psychotic disturbance, mood disturbance, or anxiety (CMS/HCC)     Mild but has slowly been declining.  He has also had some anxiety and is being followed by psychiatry.          Leg edema     He has bialteral leg edema, using compression stocking, minimal response to lasix which is being discontinued today due to kidney function.          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  Lasix was stopped due to worsening kidney function.             labs/meds/orders reviewed  staff to monitor and notify for any changes.   Next full abs 2/24 and prn  Case discussed with Dr Luna, will dc lasix and repeat BMP in 1 week to evaluate.  Will also order echo, last known echo 2017 and concern for worsening aortic stenosis, he would not be a good candidate for TAVR.    Spoke with daughter Sydney over phone and discussed need to stop lasix and also echo being ordered to evaluate.  Time for coordination of care was greater than 35 minutes [facility] chart  review, visit and exam, discussion with nursing, Dr. Luna,  and daughter israel over phone.  Await repeat BMP next week and echo results.

## 2023-12-01 VITALS
SYSTOLIC BLOOD PRESSURE: 117 MMHG | BODY MASS INDEX: 26.47 KG/M2 | WEIGHT: 169 LBS | HEART RATE: 66 BPM | DIASTOLIC BLOOD PRESSURE: 64 MMHG | OXYGEN SATURATION: 94 % | RESPIRATION RATE: 20 BRPM | TEMPERATURE: 97.9 F

## 2023-12-01 ASSESSMENT — ENCOUNTER SYMPTOMS
DIFFICULTY URINATING: 0
FATIGUE: 0
CHILLS: 0
CONSTIPATION: 0
ABDOMINAL PAIN: 0
FEVER: 0
SHORTNESS OF BREATH: 0
VOMITING: 0
COUGH: 0
NAUSEA: 0
DIARRHEA: 0
PALPITATIONS: 0

## 2023-12-01 NOTE — ASSESSMENT & PLAN NOTE
Started on metoprolol through cardiology due to PVC's and pacer settings were recently adjusted.  BP acceptable.  Continue to monitor and adjust meds based on clinical course.

## 2023-12-01 NOTE — ASSESSMENT & PLAN NOTE
No symptoms of central fluid volume overload.  Lasix was stopped due to worsening kidney function.

## 2023-12-01 NOTE — ASSESSMENT & PLAN NOTE
He has bialteral leg edema, using compression stocking, minimal response to lasix which is being discontinued today due to kidney function.

## 2023-12-01 NOTE — ASSESSMENT & PLAN NOTE
Mild but has slowly been declining.  He has also had some anxiety and is being followed by psychiatry.

## 2023-12-01 NOTE — ASSESSMENT & PLAN NOTE
Denies any sob, lightheadedness at present but has had intermittent dizziness.  He was started on lasix which is being stopped today due to kidney function.  There is concern for worsening aortic stenosis and echo is ordered to evaluate valve.

## 2023-12-01 NOTE — ASSESSMENT & PLAN NOTE
BUN and creatinine signifncantly increased from baseline and today's labs show BUN 76 and creatinine 2.7.    Case discussed with Dr Luna, lasix will be stopped and repeat BMP ordered for 1 week.

## 2023-12-04 ENCOUNTER — HOSPITAL ENCOUNTER (OUTPATIENT)
Dept: CARDIOLOGY | Facility: CLINIC | Age: 88
Discharge: HOME | End: 2023-12-04
Payer: MEDICARE

## 2023-12-04 VITALS
WEIGHT: 166 LBS | DIASTOLIC BLOOD PRESSURE: 70 MMHG | SYSTOLIC BLOOD PRESSURE: 118 MMHG | BODY MASS INDEX: 26.06 KG/M2 | HEIGHT: 67 IN

## 2023-12-04 DIAGNOSIS — I35.0 NONRHEUMATIC AORTIC VALVE STENOSIS: ICD-10-CM

## 2023-12-04 DIAGNOSIS — I50.9 CHRONIC CONGESTIVE HEART FAILURE, UNSPECIFIED HEART FAILURE TYPE (MULTI): ICD-10-CM

## 2023-12-04 LAB
AORTIC VALVE MEAN GRADIENT: 23
AORTIC VALVE PEAK VELOCITY: 3.14
AV PEAK GRADIENT: 39.4
AVA (PEAK VEL): 0.66
AVA (VTI): 0.64
EJECTION FRACTION APICAL 4 CHAMBER: 18.9
LEFT VENTRICLE INTERNAL DIMENSION DIASTOLE: 5.53 (ref 3.5–6)
LEFT VENTRICULAR OUTFLOW TRACT DIAMETER: 2.1
MITRAL VALVE E/A RATIO: 2.35
MITRAL VALVE E/E' RATIO: 13.2
RIGHT VENTRICLE PEAK SYSTOLIC PRESSURE: 54

## 2023-12-04 PROCEDURE — 93306 TTE W/DOPPLER COMPLETE: CPT | Performed by: INTERNAL MEDICINE

## 2023-12-04 PROCEDURE — 93306 TTE W/DOPPLER COMPLETE: CPT

## 2023-12-05 ENCOUNTER — NURSING HOME VISIT (OUTPATIENT)
Dept: POST ACUTE CARE | Facility: EXTERNAL LOCATION | Age: 88
End: 2023-12-05
Payer: MEDICARE

## 2023-12-05 VITALS
RESPIRATION RATE: 20 BRPM | BODY MASS INDEX: 26 KG/M2 | SYSTOLIC BLOOD PRESSURE: 104 MMHG | DIASTOLIC BLOOD PRESSURE: 62 MMHG | WEIGHT: 166 LBS | HEART RATE: 67 BPM | OXYGEN SATURATION: 98 % | TEMPERATURE: 97.8 F

## 2023-12-05 DIAGNOSIS — I48.91 ATRIAL FIBRILLATION, UNSPECIFIED TYPE (MULTI): ICD-10-CM

## 2023-12-05 DIAGNOSIS — G30.1 MILD LATE ONSET ALZHEIMER'S DEMENTIA WITHOUT BEHAVIORAL DISTURBANCE, PSYCHOTIC DISTURBANCE, MOOD DISTURBANCE, OR ANXIETY (MULTI): ICD-10-CM

## 2023-12-05 DIAGNOSIS — N28.9 RENAL INSUFFICIENCY: ICD-10-CM

## 2023-12-05 DIAGNOSIS — I35.0 NONRHEUMATIC AORTIC VALVE STENOSIS: Primary | ICD-10-CM

## 2023-12-05 DIAGNOSIS — N18.31 STAGE 3A CHRONIC KIDNEY DISEASE (MULTI): ICD-10-CM

## 2023-12-05 DIAGNOSIS — F02.A0 MILD LATE ONSET ALZHEIMER'S DEMENTIA WITHOUT BEHAVIORAL DISTURBANCE, PSYCHOTIC DISTURBANCE, MOOD DISTURBANCE, OR ANXIETY (MULTI): ICD-10-CM

## 2023-12-05 PROCEDURE — 99309 SBSQ NF CARE MODERATE MDM 30: CPT | Performed by: NURSE PRACTITIONER

## 2023-12-05 ASSESSMENT — ENCOUNTER SYMPTOMS
COUGH: 0
FATIGUE: 0
DIARRHEA: 0
ABDOMINAL PAIN: 0
NAUSEA: 0
DIFFICULTY URINATING: 0
SHORTNESS OF BREATH: 0
FEVER: 0
CONSTIPATION: 0
PALPITATIONS: 0
VOMITING: 0
CHILLS: 0

## 2023-12-05 NOTE — ASSESSMENT & PLAN NOTE
Denies any sob, lightheadedness at present but has had intermittent dizziness.   Echo confirmed suspicion of severe aortic stenosis and EF 15-20% which lends to poor prognosis as he is not a candidate for TAVR.    I placed a call to daughter Sydney to notify of results, left message to call facility.  I will also message his cardiologists to inform of test results.

## 2023-12-05 NOTE — ASSESSMENT & PLAN NOTE
monitor with routine labs, there was significant worsening of kidney function on labs last week prompting lasix discontinuation and BMP is pending for later this week.

## 2023-12-05 NOTE — PROGRESS NOTES
Subjective   Victorino Bejarano is a 89 y.o. male Here to follow up on intermittent dizziness and echo.  HPI    Echo was completed yesterday showing severe aortic stenosis and also EF 15-20%.  He has had a  functional decline.  Pasha is sitting up in chair, states he is feeling ok now.  Denies any dizziness, palpitations or sob when asked.    Lasix was stopped last week due to worsening kidney function and repeat labs ordered for later this week.      Review of Systems   Constitutional:  Negative for chills, fatigue and fever.   Respiratory:  Negative for cough and shortness of breath.    Cardiovascular:  Negative for chest pain and palpitations.   Gastrointestinal:  Negative for abdominal pain, constipation, diarrhea, nausea and vomiting.   Genitourinary:  Negative for difficulty urinating.       Objective   /62   Pulse 67   Temp 36.6 °C (97.8 °F)   Resp 20   Wt 75.3 kg (166 lb)   SpO2 98%   BMI 26.00 kg/m²     Physical Exam  Constitutional:       General: He is not in acute distress.  HENT:      Head: Normocephalic and atraumatic.   Eyes:      Conjunctiva/sclera: Conjunctivae normal.   Cardiovascular:      Rate and Rhythm: Normal rate. Rhythm irregular.   Pulmonary:      Effort: Pulmonary effort is normal. No respiratory distress.      Breath sounds: Normal breath sounds.   Abdominal:      General: Bowel sounds are normal. There is no distension.      Palpations: Abdomen is soft.      Tenderness: There is no abdominal tenderness.   Musculoskeletal:         General: Normal range of motion.      Right lower leg: Edema (2+) present.      Left lower leg: Edema (2+) present.   Skin:     General: Skin is warm and dry.   Neurological:      General: No focal deficit present.      Mental Status: He is alert.      Comments: Slightly forgetful, poor historian.     Psychiatric:         Mood and Affect: Mood normal.         Behavior: Behavior normal.         Assessment/Plan   Problem List Items Addressed This Visit        Nonrheumatic aortic valve stenosis - Primary     Denies any sob, lightheadedness at present but has had intermittent dizziness.   Echo confirmed suspicion of severe aortic stenosis and EF 15-20% which lends to poor prognosis as he is not a candidate for TAVR.    I placed a call to shay Grimes to notify of results, left message to call facility.  I will also message his cardiologists to inform of test results.           Renal insufficiency     Lasix stopped last week, repeat BMP pending for later this week          Mild late onset Alzheimer's dementia without behavioral disturbance, psychotic disturbance, mood disturbance, or anxiety (CMS/HCC)     Mild but has slowly been declining.  He has also had some anxiety and is being followed by psychiatry.          Stage 3a chronic kidney disease (CMS/[facility])     monitor with routine labs, there was significant worsening of kidney function on labs last week prompting lasix discontinuation and BMP is pending for later this week.            Atrial fibrillation (CMS/[facility])     Rate controlled.           labs/meds/orders reviewed  staff to monitor and notify for any changes.   Next full labs 2/24 and prn but will have repeat BMP later this week.    Attempted to call Sydney (daughter/POA) and update on echo results.    He has severe aortic stenosis and would not be a good candidate for TAVR.      Messaged Dr Palm and Dr Bingham to update on echo results as well.

## 2023-12-05 NOTE — LETTER
Patient: Victorino Bejarano  : 10/25/1924    Encounter Date: 2023    Subjective  Victorino Bejarano is a 89 y.o. male Here to follow up on intermittent dizziness and echo.  HPI    Echo was completed yesterday showing severe aortic stenosis and also EF 15-20%.  He has had a  functional decline.  Pasha is sitting up in chair, states he is feeling ok now.  Denies any dizziness, palpitations or sob when asked.    Lasix was stopped last week due to worsening kidney function and repeat labs ordered for later this week.      Review of Systems   Constitutional:  Negative for chills, fatigue and fever.   Respiratory:  Negative for cough and shortness of breath.    Cardiovascular:  Negative for chest pain and palpitations.   Gastrointestinal:  Negative for abdominal pain, constipation, diarrhea, nausea and vomiting.   Genitourinary:  Negative for difficulty urinating.       Objective  /62   Pulse 67   Temp 36.6 °C (97.8 °F)   Resp 20   Wt 75.3 kg (166 lb)   SpO2 98%   BMI 26.00 kg/m²     Physical Exam  Constitutional:       General: He is not in acute distress.  HENT:      Head: Normocephalic and atraumatic.   Eyes:      Conjunctiva/sclera: Conjunctivae normal.   Cardiovascular:      Rate and Rhythm: Normal rate. Rhythm irregular.   Pulmonary:      Effort: Pulmonary effort is normal. No respiratory distress.      Breath sounds: Normal breath sounds.   Abdominal:      General: Bowel sounds are normal. There is no distension.      Palpations: Abdomen is soft.      Tenderness: There is no abdominal tenderness.   Musculoskeletal:         General: Normal range of motion.      Right lower leg: Edema (2+) present.      Left lower leg: Edema (2+) present.   Skin:     General: Skin is warm and dry.   Neurological:      General: No focal deficit present.      Mental Status: He is alert.      Comments: Slightly forgetful, poor historian.     Psychiatric:         Mood and Affect: Mood normal.         Behavior: Behavior  normal.         Assessment/Plan  Problem List Items Addressed This Visit       Nonrheumatic aortic valve stenosis - Primary     Denies any sob, lightheadedness at present but has had intermittent dizziness.   Echo confirmed suspicion of severe aortic stenosis and EF 15-20% which lends to poor prognosis as he is not a candidate for TAVR.    I placed a call to shay Grimes to notify of results, left message to call facility.  I will also message his cardiologists to inform of test results.           Renal insufficiency     Lasix stopped last week, repeat BMP pending for later this week          Mild late onset Alzheimer's dementia without behavioral disturbance, psychotic disturbance, mood disturbance, or anxiety (CMS/HCC)     Mild but has slowly been declining.  He has also had some anxiety and is being followed by psychiatry.          Stage 3a chronic kidney disease (CMS/HCC)     monitor with routine labs, there was significant worsening of kidney function on labs last week prompting lasix discontinuation and BMP is pending for later this week.            Atrial fibrillation (CMS/HCC)     Rate controlled.           labs/meds/orders reviewed  staff to monitor and notify for any changes.   Next full labs 2/24 and prn but will have repeat BMP later this week.    Attempted to call Sydney (daughter/POA) and update on echo results.    He has severe aortic stenosis and would not be a good candidate for TAVR.      Messaged Dr Palm and Dr Bingham to update on echo results as well.             Electronically Signed By: NIELS Camarillo   12/5/23  3:47 PM

## 2023-12-12 ENCOUNTER — NURSING HOME VISIT (OUTPATIENT)
Dept: POST ACUTE CARE | Facility: EXTERNAL LOCATION | Age: 88
End: 2023-12-12
Payer: MEDICARE

## 2023-12-12 VITALS
BODY MASS INDEX: 25.69 KG/M2 | SYSTOLIC BLOOD PRESSURE: 132 MMHG | OXYGEN SATURATION: 93 % | DIASTOLIC BLOOD PRESSURE: 68 MMHG | HEART RATE: 60 BPM | TEMPERATURE: 97.8 F | WEIGHT: 164 LBS | RESPIRATION RATE: 20 BRPM

## 2023-12-12 DIAGNOSIS — G30.1 MILD LATE ONSET ALZHEIMER'S DEMENTIA WITHOUT BEHAVIORAL DISTURBANCE, PSYCHOTIC DISTURBANCE, MOOD DISTURBANCE, OR ANXIETY (MULTI): ICD-10-CM

## 2023-12-12 DIAGNOSIS — N18.31 STAGE 3A CHRONIC KIDNEY DISEASE (MULTI): ICD-10-CM

## 2023-12-12 DIAGNOSIS — I50.20 HFREF (HEART FAILURE WITH REDUCED EJECTION FRACTION) (MULTI): ICD-10-CM

## 2023-12-12 DIAGNOSIS — I48.91 ATRIAL FIBRILLATION, UNSPECIFIED TYPE (MULTI): ICD-10-CM

## 2023-12-12 DIAGNOSIS — I35.0 NONRHEUMATIC AORTIC VALVE STENOSIS: Primary | ICD-10-CM

## 2023-12-12 DIAGNOSIS — F02.A0 MILD LATE ONSET ALZHEIMER'S DEMENTIA WITHOUT BEHAVIORAL DISTURBANCE, PSYCHOTIC DISTURBANCE, MOOD DISTURBANCE, OR ANXIETY (MULTI): ICD-10-CM

## 2023-12-12 PROCEDURE — 99309 SBSQ NF CARE MODERATE MDM 30: CPT | Performed by: NURSE PRACTITIONER

## 2023-12-12 ASSESSMENT — ENCOUNTER SYMPTOMS
DIARRHEA: 0
VOMITING: 0
SHORTNESS OF BREATH: 0
DIFFICULTY URINATING: 0
COUGH: 0
FATIGUE: 0
NAUSEA: 0
FEVER: 0
CONSTIPATION: 0
PALPITATIONS: 0
CHILLS: 0
ABDOMINAL PAIN: 0

## 2023-12-12 NOTE — PROGRESS NOTES
Subjective   Victorino Bejarano is a 89 y.o. male Here to follow up on aortic stenosis, HFrEF.    HPI   Here to follow up on recent echo and overall decline  Echo showed worsening aortic stenosis and also ef 15-20%.    He has had a  functional decline.  Pasha is sitting up in chair, states he is feeling ok now, granddaughter present in room.  Denies any dizziness, palpitations or sob when asked.    His oral intake has decreased, at times refusing medications.  Daughters met with hospice last week and are meeting again with them tomorrow to discuss options.       Review of Systems   Constitutional:  Negative for chills, fatigue and fever.   Respiratory:  Negative for cough and shortness of breath.    Cardiovascular:  Negative for chest pain and palpitations.   Gastrointestinal:  Negative for abdominal pain, constipation, diarrhea, nausea and vomiting.   Genitourinary:  Negative for difficulty urinating.       Objective   /68   Pulse 60   Temp 36.6 °C (97.8 °F)   Resp 20   Wt 74.4 kg (164 lb)   SpO2 93%   BMI 25.69 kg/m²     Physical Exam  Constitutional:       General: He is not in acute distress.  HENT:      Head: Normocephalic and atraumatic.   Eyes:      Conjunctiva/sclera: Conjunctivae normal.   Cardiovascular:      Rate and Rhythm: Normal rate. Rhythm irregular.   Pulmonary:      Effort: Pulmonary effort is normal. No respiratory distress.      Breath sounds: Normal breath sounds.   Abdominal:      General: Bowel sounds are normal. There is no distension.      Palpations: Abdomen is soft.      Tenderness: There is no abdominal tenderness.   Musculoskeletal:         General: Normal range of motion.      Right lower leg: Edema (2+) present.      Left lower leg: Edema (2+) present.   Skin:     General: Skin is warm and dry.   Neurological:      General: No focal deficit present.      Mental Status: He is alert.      Comments: Slightly forgetful, poor historian.     Psychiatric:         Mood and Affect:  Mood normal.         Behavior: Behavior normal.         Assessment/Plan   Problem List Items Addressed This Visit       Nonrheumatic aortic valve stenosis - Primary     Denies any sob, lightheadedness at present but has had intermittent dizziness.   Echo confirmed suspicion of severe aortic stenosis and EF 15-20% which lends to poor prognosis as he is not a candidate for TAVR.     Daughters met with hospice last week and are meeting with hospice [facility] tomorrow to discuss options.  He is appearing weaker, not eating as well.            Mild late onset Alzheimer's dementia without behavioral disturbance, psychotic disturbance, mood disturbance, or anxiety (CMS/HCC)     Mild but has slowly been declining.  He has also had some anxiety and is being followed by psychiatry.          Stage 3a chronic kidney disease (CMS/HCC)     Worsening kidney function on lasix which was stopped.  Creatinine decreased to 2.5 (from 2.7) after stopping lasix.  BUN up to 82 (was 76).            Atrial fibrillation (CMS/HCC)     Rate controlled.          HFrEF (heart failure with reduced ejection fraction) (CMS/HCC)     Not able to tolerate lasix due to worsening renal function          labs/meds/orders reviewed  staff to monitor and notify for any changes.   Next full labs 2/24 and prn if he does not enter hospice    He has severe aortic stenosis and would not be a good candidate for TAVR, would be considered hospice appropriate. .      Daughters meeting with hospice tomorrow.

## 2023-12-12 NOTE — ASSESSMENT & PLAN NOTE
Worsening kidney function on lasix which was stopped.  Creatinine decreased to 2.5 (from 2.7) after stopping lasix.  BUN up to 82 (was 76).

## 2023-12-12 NOTE — LETTER
Patient: Victorino Bejarano  : 10/25/1924    Encounter Date: 2023    Subjective  Victorino Bejarano is a 89 y.o. male Here to follow up on aortic stenosis, HFrEF.    HPI   Here to follow up on recent echo and overall decline  Echo showed worsening aortic stenosis and also ef 15-20%.    He has had a  functional decline.  Pasha is sitting up in chair, states he is feeling ok now, granddaughter present in room.  Denies any dizziness, palpitations or sob when asked.    His oral intake has decreased, at times refusing medications.  Daughters met with hospice last week and are meeting again with them tomorrow to discuss options.       Review of Systems   Constitutional:  Negative for chills, fatigue and fever.   Respiratory:  Negative for cough and shortness of breath.    Cardiovascular:  Negative for chest pain and palpitations.   Gastrointestinal:  Negative for abdominal pain, constipation, diarrhea, nausea and vomiting.   Genitourinary:  Negative for difficulty urinating.       Objective  /68   Pulse 60   Temp 36.6 °C (97.8 °F)   Resp 20   Wt 74.4 kg (164 lb)   SpO2 93%   BMI 25.69 kg/m²     Physical Exam  Constitutional:       General: He is not in acute distress.  HENT:      Head: Normocephalic and atraumatic.   Eyes:      Conjunctiva/sclera: Conjunctivae normal.   Cardiovascular:      Rate and Rhythm: Normal rate. Rhythm irregular.   Pulmonary:      Effort: Pulmonary effort is normal. No respiratory distress.      Breath sounds: Normal breath sounds.   Abdominal:      General: Bowel sounds are normal. There is no distension.      Palpations: Abdomen is soft.      Tenderness: There is no abdominal tenderness.   Musculoskeletal:         General: Normal range of motion.      Right lower leg: Edema (2+) present.      Left lower leg: Edema (2+) present.   Skin:     General: Skin is warm and dry.   Neurological:      General: No focal deficit present.      Mental Status: He is alert.      Comments: Slightly  forgetful, poor historian.     Psychiatric:         Mood and Affect: Mood normal.         Behavior: Behavior normal.         Assessment/Plan  Problem List Items Addressed This Visit       Nonrheumatic aortic valve stenosis - Primary     Denies any sob, lightheadedness at present but has had intermittent dizziness.   Echo confirmed suspicion of severe aortic stenosis and EF 15-20% which lends to poor prognosis as he is not a candidate for TAVR.     Daughters met with hospice last week and are meeting with hospice [facility] tomorrow to discuss options.  He is appearing weaker, not eating as well.            Mild late onset Alzheimer's dementia without behavioral disturbance, psychotic disturbance, mood disturbance, or anxiety (CMS/HCC)     Mild but has slowly been declining.  He has also had some anxiety and is being followed by psychiatry.          Stage 3a chronic kidney disease (CMS/HCC)     Worsening kidney function on lasix which was stopped.  Creatinine decreased to 2.5 (from 2.7) after stopping lasix.  BUN up to 82 (was 76).            Atrial fibrillation (CMS/HCC)     Rate controlled.          HFrEF (heart failure with reduced ejection fraction) (CMS/[facility])     Not able to tolerate lasix due to worsening renal function          labs/meds/orders reviewed  staff to monitor and notify for any changes.   Next full labs 2/24 and prn if he does not enter hospice    He has severe aortic stenosis and would not be a good candidate for TAVR, would be considered hospice appropriate. .      Daughters meeting with hospice tomorrow.                Electronically Signed By: NIELS Camarillo   12/12/23  3:42 PM

## 2023-12-12 NOTE — ASSESSMENT & PLAN NOTE
Denies any sob, lightheadedness at present but has had intermittent dizziness.   Echo confirmed suspicion of severe aortic stenosis and EF 15-20% which lends to poor prognosis as he is not a candidate for TAVR.     Daughters met with hospice last week and are meeting with hospice of [facility] tomorrow to discuss options.  He is appearing weaker, not eating as well.

## 2023-12-19 ENCOUNTER — TELEPHONE (OUTPATIENT)
Dept: PRIMARY CARE | Facility: CLINIC | Age: 88
End: 2023-12-19
Payer: MEDICARE

## 2023-12-19 NOTE — TELEPHONE ENCOUNTER
Shawn at [facility] advised patient passed away at [facility].  DC is in EDRS for you to complete.       10/25/1924  DOD 10/17/2023  TOD [id number]

## 2024-02-27 ENCOUNTER — APPOINTMENT (OUTPATIENT)
Dept: CARDIOLOGY | Facility: CLINIC | Age: 89
End: 2024-02-27
Payer: MEDICARE

## 2024-04-02 ENCOUNTER — APPOINTMENT (OUTPATIENT)
Dept: CARDIOLOGY | Facility: CLINIC | Age: 89
End: 2024-04-02
Payer: MEDICARE